# Patient Record
Sex: FEMALE | Race: WHITE
[De-identification: names, ages, dates, MRNs, and addresses within clinical notes are randomized per-mention and may not be internally consistent; named-entity substitution may affect disease eponyms.]

---

## 2017-04-28 VITALS — DIASTOLIC BLOOD PRESSURE: 71 MMHG | SYSTOLIC BLOOD PRESSURE: 146 MMHG

## 2017-06-02 ENCOUNTER — HOSPITAL ENCOUNTER (OUTPATIENT)
Dept: HOSPITAL 61 - ECHO | Age: 82
Discharge: HOME | End: 2017-06-02
Attending: INTERNAL MEDICINE
Payer: COMMERCIAL

## 2017-06-02 DIAGNOSIS — I27.0: ICD-10-CM

## 2017-06-02 DIAGNOSIS — I08.1: Primary | ICD-10-CM

## 2017-06-02 DIAGNOSIS — I50.9: ICD-10-CM

## 2017-06-02 PROCEDURE — 93306 TTE W/DOPPLER COMPLETE: CPT

## 2017-06-02 NOTE — CARD
--------------- APPROVED REPORT --------------





EXAM: Two-dimensional and M-mode echocardiogram with Doppler and color Doppler.



Other Information 

Quality : GoodHR: 81bpm

Rhythm : Atrial Fibrillation



INDICATION

Congestive Heart Failure 

Pulmonary hypertension



2D DIMENSIONS 

RVDd3.3 (2.9-3.5cm)Left Atrium(2D)4.5 (1.6-4.0cm)

IVSd1.0 (0.7-1.1cm)Aortic Root(2D)2.5 (2.0-3.7cm)

LVDd5.1 (3.9-5.9cm)LVOT Diameter2.0 (1.8-2.4cm)

PWd1.0 (0.7-1.1cm)LVDs3.4 (2.5-4.0cm)

FS (%) 32.3 %SV73.7 ml

LVEF(%)60.2 (>50%)



Aortic Valve

AoV Peak Berry.143.5cm/sAoV VTI33.5cm

AO Peak GR.8.2mmHgLVOT Peak Berry.103.6cm/s

AO Mean GR.5mmHgAVA (VMAX)2.33cm2



Mitral Valve

MV E Peak Gr.12mmHgMV E Mean Gr.3mmHg



Pulmonary Valve

PV Peak Vhmkyjsf75.1cm/s



Tricuspid Valve

TR P. Lnpgyfsi010rh/sTR Peak Gr.45mmHg



Pulmonary Vein

S1 Txzxgodj668.5cm/s



 LEFT VENTRICLE 

The left ventricle is normal size. There is normal left ventricular wall thickness. The left ventricu
lar systolic function is normal and the ejection fraction is within normal range. The Ejection Fracti
on is 55-60%. There is normal LV segmental wall motion. Tissue Doppler assessment suggests moderate l
eft ventricular diastolic dysfunction.



 RIGHT VENTRICLE 

The right ventricle is normal size. There is normal right ventricular wall thickness. The right ventr
icular systolic function is normal.



 ATRIA 

The left atrium is mildly dilated. The right atrium is mildly dilated. The interatrial septum is inta
ct with no evidence for an atrial septal defect or patent foramen ovale as noted on 2-D or Doppler im
aging.



 AORTIC VALVE 

The aortic valve is mildly sclerotic. The aortic valve is trileaflet. Doppler and Color Flow revealed
 no significant aortic regurgitation. There is no significant aortic valvular stenosis.



 MITRAL VALVE 

Mitral annular calcification is mild. The mitral valve leaflets are thickened. There is no evidence o
f mitral valve prolapse. There is no mitral valve stenosis. Doppler and Color Flow revealed mild mitr
al regurgitation.



 TRICUSPID VALVE 

Doppler and Color Flow revealed mild tricuspid regurgitation. The pulmonary artery systolic pressure 
is estimated at 48 mmHg. There is moderate pulmonary hypertension.



 PULMONIC VALVE 

Doppler and Color Flow revealed no pulmonic valvular regurgitation. There is no pulmonic valvular luis
nosis.



 GREAT VESSELS 

The aortic root is normal in size. The ascending aorta is normal in size. The pulmonary artery is nor
mal. The IVC is normal in size and collapses >50% with inspiration.



 PERICARDIAL EFFUSION 

There is no pleural effusion. There is no evidence of significant pericardial effusion.



Critical Notification

Critical Value: No



<Conclusion>

The left ventricular systolic function is normal and the ejection fraction is within normal range. Th
e Ejection Fraction is 55-60%.

There is normal LV segmental wall motion.

Tissue Doppler assessment suggests moderate left ventricular diastolic dysfunction.

Doppler and Color Flow revealed mild tricuspid regurgitation. The pulmonary artery systolic pressure 
is estimated at 48 mmHg. There is moderate pulmonary hypertension.

## 2018-02-10 ENCOUNTER — HOSPITAL ENCOUNTER (INPATIENT)
Dept: HOSPITAL 61 - ER | Age: 83
LOS: 1 days | Discharge: HOME | DRG: 292 | End: 2018-02-11
Attending: INTERNAL MEDICINE | Admitting: INTERNAL MEDICINE
Payer: COMMERCIAL

## 2018-02-10 DIAGNOSIS — I27.20: ICD-10-CM

## 2018-02-10 DIAGNOSIS — K22.4: ICD-10-CM

## 2018-02-10 DIAGNOSIS — I50.32: ICD-10-CM

## 2018-02-10 DIAGNOSIS — Z82.0: ICD-10-CM

## 2018-02-10 DIAGNOSIS — F02.80: ICD-10-CM

## 2018-02-10 DIAGNOSIS — G30.9: ICD-10-CM

## 2018-02-10 DIAGNOSIS — I11.0: Primary | ICD-10-CM

## 2018-02-10 DIAGNOSIS — Z79.01: ICD-10-CM

## 2018-02-10 DIAGNOSIS — E11.9: ICD-10-CM

## 2018-02-10 DIAGNOSIS — I48.2: ICD-10-CM

## 2018-02-10 DIAGNOSIS — Z79.84: ICD-10-CM

## 2018-02-10 DIAGNOSIS — Z79.82: ICD-10-CM

## 2018-02-10 DIAGNOSIS — Z87.891: ICD-10-CM

## 2018-02-10 DIAGNOSIS — Z91.040: ICD-10-CM

## 2018-02-10 DIAGNOSIS — I25.10: ICD-10-CM

## 2018-02-10 DIAGNOSIS — I16.0: ICD-10-CM

## 2018-02-10 DIAGNOSIS — E78.5: ICD-10-CM

## 2018-02-10 DIAGNOSIS — I48.0: ICD-10-CM

## 2018-02-10 DIAGNOSIS — Z88.8: ICD-10-CM

## 2018-02-10 DIAGNOSIS — I48.1: ICD-10-CM

## 2018-02-10 LAB
ADD MAN DIFF?: NO
ALBUMIN SERPL-MCNC: 3.7 G/DL (ref 3.4–5)
ALP SERPL-CCNC: 107 U/L (ref 46–116)
ALT (SGPT): 7 U/L (ref 14–59)
ANION GAP SERPL CALC-SCNC: 12 MMOL/L (ref 6–14)
AST SERPL-CCNC: 17 U/L (ref 15–37)
BASO #: 0 X10^3/UL (ref 0–0.2)
BASO %: 1 % (ref 0–3)
BILIRUB DIRECT SERPL-MCNC: 0.2 MG/DL (ref 0–0.2)
BLOOD UREA NITROGEN: 21 MG/DL (ref 7–20)
CALCIUM: 9.9 MG/DL (ref 8.5–10.1)
CHLORIDE: 101 MMOL/L (ref 98–107)
CO2 SERPL-SCNC: 26 MMOL/L (ref 21–32)
CREAT SERPL-MCNC: 0.9 MG/DL (ref 0.6–1)
EOS #: 0.1 X10^3/UL (ref 0–0.7)
EOS %: 1 % (ref 0–3)
GFR SERPLBLD BASED ON 1.73 SQ M-ARVRAT: 59.8 ML/MIN
GLUCOSE SERPL-MCNC: 210 MG/DL (ref 70–99)
HCG SERPL-ACNC: 5.5 X10^3/UL (ref 4–11)
HEMATOCRIT: 38.3 % (ref 36–47)
HEMOGLOBIN: 13.1 G/DL (ref 12–15.5)
INR: 2.4 (ref 0.8–1.1)
LIPASE: 128 U/L (ref 73–393)
LYMPH #: 1.2 X10^3/UL (ref 1–4.8)
LYMPH %: 21 % (ref 24–48)
MEAN CORPUSCULAR HEMOGLOBIN: 33 PG (ref 25–35)
MEAN CORPUSCULAR HGB CONC: 34 G/DL (ref 31–37)
MEAN CORPUSCULAR VOLUME: 97 FL (ref 79–100)
MONO #: 0.6 X10^3/UL (ref 0–1.1)
MONO %: 10 % (ref 0–9)
NEUT #: 3.7 X10^3UL (ref 1.8–7.7)
NEUT %: 67 % (ref 31–73)
NT-PRO BNP: 201 PG/ML (ref 0–449)
PARTIAL THROMBOPLASTIN TIME: 37 SEC (ref 24–38)
PLATELET COUNT: 259 X10^3/UL (ref 140–400)
POC GLUCOSE: 140 MG/DL (ref 70–99)
POC GLUCOSE: 155 MG/DL (ref 70–99)
POC GLUCOSE: 166 MG/DL (ref 70–99)
POTASSIUM SERPL-SCNC: 3.6 MMOL/L (ref 3.5–5.1)
PROTHROMBIN TIME PATIENT: 24.7 SEC (ref 11.7–14)
RED BLOOD COUNT: 3.95 X10^6/UL (ref 3.5–5.4)
RED CELL DISTRIBUTION WIDTH: 14.4 % (ref 11.5–14.5)
SODIUM: 139 MMOL/L (ref 136–145)
TOTAL BILIRUBIN: 0.6 MG/DL (ref 0.2–1)
TOTAL PROTEIN: 8.6 G/DL (ref 6.4–8.2)
TROPONINI: < 0.017 NG/ML (ref 0–0.06)

## 2018-02-10 PROCEDURE — 85730 THROMBOPLASTIN TIME PARTIAL: CPT

## 2018-02-10 PROCEDURE — 83690 ASSAY OF LIPASE: CPT

## 2018-02-10 PROCEDURE — 82962 GLUCOSE BLOOD TEST: CPT

## 2018-02-10 PROCEDURE — 71045 X-RAY EXAM CHEST 1 VIEW: CPT

## 2018-02-10 PROCEDURE — 84484 ASSAY OF TROPONIN QUANT: CPT

## 2018-02-10 PROCEDURE — 85610 PROTHROMBIN TIME: CPT

## 2018-02-10 PROCEDURE — 80061 LIPID PANEL: CPT

## 2018-02-10 PROCEDURE — 85025 COMPLETE CBC W/AUTO DIFF WBC: CPT

## 2018-02-10 PROCEDURE — 93005 ELECTROCARDIOGRAM TRACING: CPT

## 2018-02-10 PROCEDURE — 78452 HT MUSCLE IMAGE SPECT MULT: CPT

## 2018-02-10 PROCEDURE — 96375 TX/PRO/DX INJ NEW DRUG ADDON: CPT

## 2018-02-10 PROCEDURE — 99285 EMERGENCY DEPT VISIT HI MDM: CPT

## 2018-02-10 PROCEDURE — 83880 ASSAY OF NATRIURETIC PEPTIDE: CPT

## 2018-02-10 PROCEDURE — 96374 THER/PROPH/DIAG INJ IV PUSH: CPT

## 2018-02-10 PROCEDURE — 36415 COLL VENOUS BLD VENIPUNCTURE: CPT

## 2018-02-10 PROCEDURE — 93017 CV STRESS TEST TRACING ONLY: CPT

## 2018-02-10 PROCEDURE — 80076 HEPATIC FUNCTION PANEL: CPT

## 2018-02-10 PROCEDURE — 80048 BASIC METABOLIC PNL TOTAL CA: CPT

## 2018-02-10 RX ADMIN — CARVEDILOL 1 MG: 6.25 TABLET, FILM COATED ORAL at 12:52

## 2018-02-10 RX ADMIN — ASPIRIN 81 MG 1 MG: 81 TABLET ORAL at 07:12

## 2018-02-10 RX ADMIN — BACITRACIN 1 MLS/HR: 5000 INJECTION, POWDER, FOR SOLUTION INTRAMUSCULAR at 09:00

## 2018-02-10 RX ADMIN — POTASSIUM CHLORIDE 1 MEQ: 1500 TABLET, EXTENDED RELEASE ORAL at 12:51

## 2018-02-10 RX ADMIN — LOSARTAN POTASSIUM 1 MG: 50 TABLET ORAL at 12:51

## 2018-02-10 RX ADMIN — LABETALOL HYDROCHLORIDE 1 MG: 5 INJECTION, SOLUTION INTRAVENOUS at 07:07

## 2018-02-10 RX ADMIN — LABETALOL HYDROCHLORIDE 1 MG: 5 INJECTION, SOLUTION INTRAVENOUS at 07:13

## 2018-02-10 RX ADMIN — ATORVASTATIN CALCIUM 1 MG: 40 TABLET, FILM COATED ORAL at 20:41

## 2018-02-10 RX ADMIN — CARVEDILOL 1 MG: 6.25 TABLET, FILM COATED ORAL at 17:48

## 2018-02-10 RX ADMIN — ACETAMINOPHEN 1 MG: 325 TABLET, FILM COATED ORAL at 12:35

## 2018-02-10 RX ADMIN — WARFARIN SODIUM 1 MG: 2.5 TABLET ORAL at 17:48

## 2018-02-10 RX ADMIN — FUROSEMIDE 1 MG: 40 TABLET ORAL at 12:52

## 2018-02-10 RX ADMIN — ASPIRIN 81 MG 1 MG: 81 TABLET ORAL at 07:10

## 2018-02-11 LAB
ADD MAN DIFF?: NO
ANION GAP SERPL CALC-SCNC: 12 MMOL/L (ref 6–14)
BASO #: 0 X10^3/UL (ref 0–0.2)
BASO %: 0 % (ref 0–3)
BLOOD UREA NITROGEN: 19 MG/DL (ref 7–20)
CALCIUM: 9.4 MG/DL (ref 8.5–10.1)
CHLORIDE: 104 MMOL/L (ref 98–107)
CHOLESTEROL/HDL RATIO: 2.9
CHOLESTEROL: 149 MG/DL (ref 0–200)
CO2 SERPL-SCNC: 24 MMOL/L (ref 21–32)
CREAT SERPL-MCNC: 0.8 MG/DL (ref 0.6–1)
EOS #: 0 X10^3/UL (ref 0–0.7)
EOS %: 1 % (ref 0–3)
GFR SERPLBLD BASED ON 1.73 SQ M-ARVRAT: 68.5 ML/MIN
GLUCOSE SERPL-MCNC: 109 MG/DL (ref 70–99)
HCG SERPL-ACNC: 6.1 X10^3/UL (ref 4–11)
HDLC: 51 MG/DL (ref 40–60)
HEMATOCRIT: 35.7 % (ref 36–47)
HEMOGLOBIN: 12.4 G/DL (ref 12–15.5)
INR: 2.2 (ref 0.8–1.1)
LDLC: 80 MG/DL (ref 0–100)
LYMPH #: 2 X10^3/UL (ref 1–4.8)
LYMPH %: 33 % (ref 24–48)
MEAN CORPUSCULAR HEMOGLOBIN: 33 PG (ref 25–35)
MEAN CORPUSCULAR HGB CONC: 35 G/DL (ref 31–37)
MEAN CORPUSCULAR VOLUME: 96 FL (ref 79–100)
MONO #: 0.8 X10^3/UL (ref 0–1.1)
MONO %: 12 % (ref 0–9)
NEUT #: 3.2 X10^3UL (ref 1.8–7.7)
NEUT %: 53 % (ref 31–73)
NON-HDL CHOLESTEROL: 98 MG/DL (ref 0–129)
PLATELET COUNT: 236 X10^3/UL (ref 140–400)
POC GLUCOSE: 111 MG/DL (ref 70–99)
POC GLUCOSE: 238 MG/DL (ref 70–99)
POTASSIUM SERPL-SCNC: 3.7 MMOL/L (ref 3.5–5.1)
PROTHROMBIN TIME PATIENT: 23.2 SEC (ref 11.7–14)
RED BLOOD COUNT: 3.73 X10^6/UL (ref 3.5–5.4)
RED CELL DISTRIBUTION WIDTH: 14 % (ref 11.5–14.5)
SODIUM: 140 MMOL/L (ref 136–145)
TRIGLYCERIDES: 90 MG/DL (ref 0–150)
VLDLC: 18 MG/DL (ref 0–40)

## 2018-02-11 RX ADMIN — POTASSIUM CHLORIDE 1 MEQ: 1500 TABLET, EXTENDED RELEASE ORAL at 13:00

## 2018-02-11 RX ADMIN — FUROSEMIDE 1 MG: 40 TABLET ORAL at 13:00

## 2018-02-11 RX ADMIN — ASPIRIN 81 MG 1 MG: 81 TABLET ORAL at 13:00

## 2018-02-11 RX ADMIN — LOSARTAN POTASSIUM 1 MG: 50 TABLET ORAL at 13:00

## 2018-02-11 RX ADMIN — REGADENOSON 1 MG: 0.08 INJECTION, SOLUTION INTRAVENOUS at 10:42

## 2018-02-11 RX ADMIN — CARVEDILOL 1 MG: 6.25 TABLET, FILM COATED ORAL at 12:59

## 2018-02-11 RX ADMIN — Medication 1 EACH: at 08:13

## 2019-12-13 ENCOUNTER — HOSPITAL ENCOUNTER (OUTPATIENT)
Dept: HOSPITAL 61 - ER | Age: 84
Setting detail: OBSERVATION
LOS: 1 days | Discharge: HOME HEALTH | End: 2019-12-14
Attending: INTERNAL MEDICINE | Admitting: INTERNAL MEDICINE
Payer: COMMERCIAL

## 2019-12-13 VITALS — HEIGHT: 63 IN | BODY MASS INDEX: 28.35 KG/M2 | WEIGHT: 160 LBS

## 2019-12-13 VITALS — SYSTOLIC BLOOD PRESSURE: 148 MMHG | DIASTOLIC BLOOD PRESSURE: 59 MMHG

## 2019-12-13 VITALS — DIASTOLIC BLOOD PRESSURE: 58 MMHG | SYSTOLIC BLOOD PRESSURE: 136 MMHG

## 2019-12-13 DIAGNOSIS — Z82.49: ICD-10-CM

## 2019-12-13 DIAGNOSIS — Z91.040: ICD-10-CM

## 2019-12-13 DIAGNOSIS — E11.22: ICD-10-CM

## 2019-12-13 DIAGNOSIS — N18.9: ICD-10-CM

## 2019-12-13 DIAGNOSIS — Z82.3: ICD-10-CM
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
## 2019-12-13 DIAGNOSIS — Z79.01: ICD-10-CM
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
## 2019-12-13 DIAGNOSIS — E78.5: ICD-10-CM

## 2019-12-13 DIAGNOSIS — E11.42: ICD-10-CM

## 2019-12-13 DIAGNOSIS — S42.301A: Primary | ICD-10-CM

## 2019-12-13 DIAGNOSIS — Z98.41: ICD-10-CM

## 2019-12-13 DIAGNOSIS — Z83.3: ICD-10-CM

## 2019-12-13 DIAGNOSIS — I27.20: ICD-10-CM

## 2019-12-13 DIAGNOSIS — I50.31: ICD-10-CM

## 2019-12-13 DIAGNOSIS — I48.19: ICD-10-CM

## 2019-12-13 DIAGNOSIS — I13.0: ICD-10-CM

## 2019-12-13 DIAGNOSIS — Z98.42: ICD-10-CM
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
## 2019-12-13 DIAGNOSIS — M17.0: ICD-10-CM

## 2019-12-13 LAB
ALBUMIN SERPL-MCNC: 3.7 G/DL (ref 3.4–5)
ALBUMIN/GLOB SERPL: 0.9 {RATIO} (ref 1–1.7)
ALP SERPL-CCNC: 85 U/L (ref 46–116)
ALT SERPL-CCNC: 16 U/L (ref 14–59)
ANION GAP SERPL CALC-SCNC: 12 MMOL/L (ref 6–14)
AST SERPL-CCNC: 18 U/L (ref 15–37)
BASOPHILS # BLD AUTO: 0 X10^3/UL (ref 0–0.2)
BASOPHILS NFR BLD: 1 % (ref 0–3)
BILIRUB SERPL-MCNC: 0.8 MG/DL (ref 0.2–1)
BUN SERPL-MCNC: 19 MG/DL (ref 7–20)
BUN/CREAT SERPL: 19 (ref 6–20)
CALCIUM SERPL-MCNC: 9 MG/DL (ref 8.5–10.1)
CHLORIDE SERPL-SCNC: 103 MMOL/L (ref 98–107)
CO2 SERPL-SCNC: 26 MMOL/L (ref 21–32)
CREAT SERPL-MCNC: 1 MG/DL (ref 0.6–1)
EOSINOPHIL NFR BLD: 0 X10^3/UL (ref 0–0.7)
EOSINOPHIL NFR BLD: 1 % (ref 0–3)
ERYTHROCYTE [DISTWIDTH] IN BLOOD BY AUTOMATED COUNT: 15.7 % (ref 11.5–14.5)
GFR SERPLBLD BASED ON 1.73 SQ M-ARVRAT: 52.7 ML/MIN
GLOBULIN SER-MCNC: 4.3 G/DL (ref 2.2–3.8)
GLUCOSE SERPL-MCNC: 175 MG/DL (ref 70–99)
HCT VFR BLD CALC: 33.3 % (ref 36–47)
HGB BLD-MCNC: 11.3 G/DL (ref 12–15.5)
LYMPHOCYTES # BLD: 0.8 X10^3/UL (ref 1–4.8)
LYMPHOCYTES NFR BLD AUTO: 12 % (ref 24–48)
MCH RBC QN AUTO: 33 PG (ref 25–35)
MCHC RBC AUTO-ENTMCNC: 34 G/DL (ref 31–37)
MCV RBC AUTO: 96 FL (ref 79–100)
MONO #: 0.6 X10^3/UL (ref 0–1.1)
MONOCYTES NFR BLD: 8 % (ref 0–9)
NEUT #: 5.6 X10^3/UL (ref 1.8–7.7)
NEUTROPHILS NFR BLD AUTO: 79 % (ref 31–73)
PLATELET # BLD AUTO: 221 X10^3/UL (ref 140–400)
POTASSIUM SERPL-SCNC: 3.6 MMOL/L (ref 3.5–5.1)
PROT SERPL-MCNC: 8 G/DL (ref 6.4–8.2)
PROTHROMBIN TIME: 25 SEC (ref 11.7–14)
RBC # BLD AUTO: 3.46 X10^6/UL (ref 3.5–5.4)
SODIUM SERPL-SCNC: 141 MMOL/L (ref 136–145)
WBC # BLD AUTO: 7.1 X10^3/UL (ref 4–11)

## 2019-12-13 PROCEDURE — G0378 HOSPITAL OBSERVATION PER HR: HCPCS

## 2019-12-13 PROCEDURE — 80053 COMPREHEN METABOLIC PANEL: CPT

## 2019-12-13 PROCEDURE — 96376 TX/PRO/DX INJ SAME DRUG ADON: CPT

## 2019-12-13 PROCEDURE — 85025 COMPLETE CBC W/AUTO DIFF WBC: CPT

## 2019-12-13 PROCEDURE — 85610 PROTHROMBIN TIME: CPT

## 2019-12-13 PROCEDURE — 96374 THER/PROPH/DIAG INJ IV PUSH: CPT

## 2019-12-13 PROCEDURE — 36415 COLL VENOUS BLD VENIPUNCTURE: CPT

## 2019-12-13 PROCEDURE — 97162 PT EVAL MOD COMPLEX 30 MIN: CPT

## 2019-12-13 PROCEDURE — 82306 VITAMIN D 25 HYDROXY: CPT

## 2019-12-13 PROCEDURE — 71045 X-RAY EXAM CHEST 1 VIEW: CPT

## 2019-12-13 PROCEDURE — 97166 OT EVAL MOD COMPLEX 45 MIN: CPT

## 2019-12-13 PROCEDURE — 99284 EMERGENCY DEPT VISIT MOD MDM: CPT

## 2019-12-13 PROCEDURE — 93005 ELECTROCARDIOGRAM TRACING: CPT

## 2019-12-13 PROCEDURE — G0379 DIRECT REFER HOSPITAL OBSERV: HCPCS

## 2019-12-13 PROCEDURE — 96375 TX/PRO/DX INJ NEW DRUG ADDON: CPT

## 2019-12-13 PROCEDURE — 82962 GLUCOSE BLOOD TEST: CPT

## 2019-12-13 PROCEDURE — 73030 X-RAY EXAM OF SHOULDER: CPT

## 2019-12-13 RX ADMIN — MORPHINE SULFATE PRN MG: 2 INJECTION, SOLUTION INTRAMUSCULAR; INTRAVENOUS at 20:19

## 2019-12-13 RX ADMIN — MORPHINE SULFATE PRN MG: 2 INJECTION, SOLUTION INTRAMUSCULAR; INTRAVENOUS at 17:24

## 2019-12-13 NOTE — RAD
Examination: SHOULDER 2+V RIGHT

 

History: Pain

 

Comparison/Correlation: None

 

Findings: 2 images of the right shoulder were obtained.

The acromioclavicular joint degenerative narrowing is mild. Transverse 

fracture deformity with displacement is noted involving the right humeral 

neck. Glenohumeral joint relationship is probably adequate although 

evaluation is limited due to positioning.

 

Impression:

Displaced transverse fracture through the right humeral neck.

 

Electronically signed by: Demarco Aguilar MD (12/13/2019 3:23 PM) [facility]

## 2019-12-13 NOTE — PHYS DOC
Past Medical History


Past Medical History:  A-Fib, CHF, Hypertension


Past Surgical History:  No Surgical History


Alcohol Use:  None


Drug Use:  None





Adult General


Chief Complaint


Chief Complaint:  SHOULDER INJURY





HPI


HPI





Patient is an 85-year-old female who presents with complaint of right shoulder 

pain and injury after falling forward off of her bed. Patient states that she 

been sitting a little bit too far forward on the bed fell over. She denies any 

other injuries. Patient rates her pain to be a 9 out of 10. She denies head 

injury or loss of consciousness. She states that pain is worsened if she tries 

to move her shoulder.[]





Review of Systems


Review of Systems





Constitutional: Denies fever or chills []


Respiratory: Denies cough or shortness of breath []


Cardiovascular: No additional information not addressed in HPI []


GI: Denies abdominal pain, nausea, vomiting or diarrhea []


Musculoskeletal: Complains of right shoulder pain []


Integument: Denies rash or skin lesions []


Neurologic: Denies headache, focal weakness or sensory changes []





All other systems were reviewed and found to be within normal limits, except as 

documented in this note.





Current Medications


Current Medications





Current Medications








 Medications


  (Trade)  Dose


 Ordered  Sig/[facility]  Start Time


 Stop Time Status Last Admin


Dose Admin


 


 Fentanyl Citrate


  (Fentanyl 2ml


 Vial)  50 mcg  1X  ONCE  12/13/19 14:45


 12/13/19 14:46 DC 12/13/19 15:02


50 MCG


 


 Ondansetron HCl


  (Zofran)  4 mg  1X  ONCE  12/13/19 14:45


 12/13/19 14:46 DC 12/13/19 15:02


4 MG











Allergies


Allergies





Allergies








Coded Allergies Type Severity Reaction Last Updated Verified


 


  latex Allergy Intermediate RASH 4/25/17 Yes


 


  felodipine Adverse Reaction Mild COUGH 4/25/17 Yes


 


  lisinopril Adverse Reaction Mild COUGH 4/25/17 Yes











Physical Exam


Physical Exam





Constitutional: Well developed, well nourished, no acute distress, non-toxic 

appearance. []


HENT: Normocephalic, atraumatic, bilateral external ears normal, oropharynx 

moist, no oral exudates, nose normal. []


Eyes: PERRLA, EOMI, conjunctiva normal, no discharge. [] 


Neck: Normal range of motion, no tenderness, supple, no stridor. [] 


Cardiovascular: Regular rate and rhythm[]


Lungs & Thorax:  Bilateral breath sounds clear to auscultation []


Abdomen: Bowel sounds normal, soft, no tenderness. [] 


Skin: Warm, dry, no erythema, no rash. [] 


Extremities: No tenderness, no cyanosis, no clubbing, ROM intact. [] 


Neurologic: Alert and oriented X 3, no focal deficits noted. []





Current Patient Data


Vital Signs





                                   Vital Signs








  Date Time  Temp Pulse Resp B/P (MAP) Pulse Ox O2 Delivery O2 Flow Rate FiO2


 


12/13/19 15:02   12  95 Nasal Cannula 2.0 


 


12/13/19 14:18 98.0 74  150/65 (93)    





 98.0       








Lab Values





                                Laboratory Tests








Test


 12/13/19


14:55


 


White Blood Count


 7.1 x10^3/uL


(4.0-11.0)


 


Red Blood Count


 3.46 x10^6/uL


(3.50-5.40)  L


 


Hemoglobin


 11.3 g/dL


(12.0-15.5)  L


 


Hematocrit


 33.3 %


(36.0-47.0)  L


 


Mean Corpuscular Volume


 96 fL ()





 


Mean Corpuscular Hemoglobin 33 pg (25-35)  


 


Mean Corpuscular Hemoglobin


Concent 34 g/dL


(31-37)


 


Red Cell Distribution Width


 15.7 %


(11.5-14.5)  H


 


Platelet Count


 221 x10^3/uL


(140-400)


 


Neutrophils (%) (Auto) 79 % (31-73)  H


 


Lymphocytes (%) (Auto) 12 % (24-48)  L


 


Monocytes (%) (Auto) 8 % (0-9)  


 


Eosinophils (%) (Auto) 1 % (0-3)  


 


Basophils (%) (Auto) 1 % (0-3)  


 


Neutrophils # (Auto)


 5.6 x10^3/uL


(1.8-7.7)


 


Lymphocytes # (Auto)


 0.8 x10^3/uL


(1.0-4.8)  L


 


Monocytes # (Auto)


 0.6 x10^3/uL


(0.0-1.1)


 


Eosinophils # (Auto)


 0.0 x10^3/uL


(0.0-0.7)


 


Basophils # (Auto)


 0.0 x10^3/uL


(0.0-0.2)


 


Prothrombin Time


 25.0 SEC


(11.7-14.0)  H


 


Prothrombin Time INR


 2.3 (0.8-1.1)


H


 


Sodium Level


 141 mmol/L


(136-145)


 


Potassium Level


 3.6 mmol/L


(3.5-5.1)


 


Chloride Level


 103 mmol/L


()


 


Carbon Dioxide Level


 26 mmol/L


(21-32)


 


Anion Gap 12 (6-14)  


 


Blood Urea Nitrogen


 19 mg/dL


(7-20)


 


Creatinine


 1.0 mg/dL


(0.6-1.0)


 


Estimated GFR


(Cockcroft-Gault) 52.7  





 


BUN/Creatinine Ratio 19 (6-20)  


 


Glucose Level


 175 mg/dL


(70-99)  H


 


Calcium Level


 9.0 mg/dL


(8.5-10.1)


 


Total Bilirubin


 0.8 mg/dL


(0.2-1.0)


 


Aspartate Amino Transferase


(AST) 18 U/L (15-37)





 


Alanine Aminotransferase (ALT)


 16 U/L (14-59)





 


Alkaline Phosphatase


 85 U/L


()


 


Total Protein


 8.0 g/dL


(6.4-8.2)


 


Albumin


 3.7 g/dL


(3.4-5.0)


 


Albumin/Globulin Ratio


 0.9 (1.0-1.7)


L





                                Laboratory Tests


12/13/19 14:55








                                Laboratory Tests


12/13/19 14:55














EKG


EKG


[]





Radiology/Procedures


Radiology/Procedures


[]


Impressions:


PROCEDURE: SHOULDER 2+V RIGHT





Examination: SHOULDER 2+V RIGHT


 


History: Pain


 


Comparison/Correlation: None


 


Findings: 2 images of the right shoulder were obtained.


The acromioclavicular joint degenerative narrowing is mild. Transverse 


fracture deformity with displacement is noted involving the right humeral 


neck. Glenohumeral joint relationship is probably adequate although 


evaluation is limited due to positioning.


 


Impression:


Displaced transverse fracture through the right humeral neck.


 


Electronically signed by: Demarco Lopez MD (12/13/2019 3:23 PM) [facility]














DICTATED and SIGNED BY:     DEMARCO LOPEZ MD


DATE:     12/13/19 1523








Course & Med Decision Making


Course & Med Decision Making


Pertinent Labs and Imaging studies reviewed. (See chart for details)





[]





Dragon Disclaimer


Dragon Disclaimer


This electronic medical record was generated, in whole or in part, using a voice

 recognition dictation system.





Departure


Departure


Impression:  


   Primary Impression:  


   Fx humeral neck


Disposition:  09 ADMITTED AS INPATIENT


Admitting Physician:  RADHA (Dr. Schwab)


Condition:  IMPROVED


Referrals:  


ADRIANA TENORIO MD (PCP)





Problem Qualifiers








   Primary Impression:  


   Fx humeral neck


   Encounter type:  initial encounter  Fracture type:  closed  Laterality:  

   right  Qualified Codes:  S42.211A - Unspecified displaced fracture of 

   surgical neck of right humerus, initial encounter for closed fracture








SALINA GAMINO Jr. DO          Dec 13, 2019 15:17

## 2019-12-14 VITALS
DIASTOLIC BLOOD PRESSURE: 45 MMHG | SYSTOLIC BLOOD PRESSURE: 133 MMHG | SYSTOLIC BLOOD PRESSURE: 133 MMHG | DIASTOLIC BLOOD PRESSURE: 45 MMHG

## 2019-12-14 VITALS — SYSTOLIC BLOOD PRESSURE: 175 MMHG | DIASTOLIC BLOOD PRESSURE: 84 MMHG

## 2019-12-14 VITALS — SYSTOLIC BLOOD PRESSURE: 171 MMHG | DIASTOLIC BLOOD PRESSURE: 66 MMHG

## 2019-12-14 RX ADMIN — MORPHINE SULFATE PRN MG: 2 INJECTION, SOLUTION INTRAMUSCULAR; INTRAVENOUS at 02:48

## 2019-12-14 RX ADMIN — MORPHINE SULFATE PRN MG: 2 INJECTION, SOLUTION INTRAMUSCULAR; INTRAVENOUS at 08:13

## 2019-12-14 NOTE — DS
DATE OF DISCHARGE:  12/14/2019



CONSULTANTS:  Dr. Ross and Dr. Nguyen.



FINAL DIAGNOSES:

1.  Displaced transverse fracture through the right humerus neck secondary to a

mechanical fall.

2.  Persistent atrial fibrillation, on Coumadin.

3.  Diabetes mellitus type 2.

4.  Hypertension.

5.  Hyperlipidemia.



HOSPITAL COURSE:  The patient is an 85-year-old white female with a history of

persistent atrial fibrillation, on Coumadin; diabetes mellitus type 2;

hypertension; hyperlipidemia,, sitting at the side of the bed, getting ready to

fold laundry when she fell forward on her right side on the floor and ____ pain

in the right upper extremity, sought help at the [facility]

Emergency Room on 12/13/2019 where x-rays showed a displaced transverse fracture

of the right humerus neck.  Right shoulder was immobilized.  The Emergency Room

doctor spoke with the orthopedist and the Emergency Room felt that she did not

require any surgery or surgical intervention at this time and the patient was

admitted to the hospital for further observation.  She was seen in consultation

by Dr. Nguyen for physical rehabilitation consultation and felt that she can go

home with home health and physical therapy and she is seen by the physical

therapist right now and she is ambulating with a cane.  She is right handed and

her daughter lives with her and can assist with her at home.  The patient denied

any head trauma and does not have any headache or any other pain anywhere else. 

She does have osteoarthritis in her knees but we decided not to give her an

injection as she has diabetes mellitus and her blood sugars are running a little

bit high in the upper 100s.  Her blood pressure is elevated too and she was

given some analgesics and will be dismissed on hydrocodone p.r.n.  She is able

to ambulate with a cane and I just saw her moments ago with a physical

therapist.  



Review of her test results; sodium 141, potassium 3.6, chloride 103, total CO2

is 26, BUN 19, creatinine 1.0.  Fingerstick blood sugar was 198 last night. 

Liver function tests are normal.  Albumin was 3.7.  INR was 2.3.  White count

7.1, hemoglobin 11.3, platelet count 221,000.  EKG and chest x-ray, I just

ordered them and they have not been done yet.  She received physical therapy. 

She will receive occupational therapy and was seen by Dr. Nguyen who feels that

she can be dismissed today to home with home health.  We will dismiss her later

today if it is okay with the orthopedist.  Dr. Ross was consulted.  We are going

to contact him to see if he can see the patient today and did discuss the case

with him to see if she can go home with home health and home physical and

occupational therapy with a registered nurse.  She was told to make an

appointment to see Dr. Terrell in 1 week and follow up with Dr. Ross.  I will

have an order here for a toilet riser and a cane.  She will be dismissed on

Norco 5/325 one every 4 hours p.r.n. severe pain, 30 tablets and no refill and

also continue with her other current medications at home including amlodipine 10

mg every day, aspirin 81 mg every day, carvedilol 6.25 mg b.i.d., furosemide 40

mg every day, glipizide 5 mg every day, losartan 100 mg every day, lovastatin 40

mg every day, Coumadin 2 mg every day, potassium chloride 20 mEq every day,

metformin 500 mg 2 tablets in the morning and 1 tablet in the p.m.

 



______________________________

ADRIANA TERRELL MD DR:  CEASAR/hilario  JOB#:  [id number] / [id number]

DD:  12/14/2019 10:41  DT:  12/14/2019 12:36

## 2019-12-14 NOTE — HP
ADMIT DATE:  12/13/2019



LOCATION:  She is in room 432.



HISTORY OF PRESENT ILLNESS:  The patient is an 85-year-old white female with

history of diabetes mellitus type 2, hypertension, hyperlipidemia and persistent

atrial fibrillation, on Coumadin, who was sitting at the side of her bed at home

when she was ready to fold laundry and she fell on her right side and had

developed pain in the right upper extremity.  She denied any head trauma and/or

loss of consciousness.  She went to the [facility] Emergency Room

where x-rays revealed a displaced transverse fracture through the right humerus

neck.  She was placed on a right upper extremity splint and admitted to the

hospital for further evaluation and treatment.  She is comfortable with her

analgesics.  She is alert and coherent.  She does live with her daughter who

helps her at home.  She is right handed.



ALLERGIES AND INTOLERANCES:  INCLUDE FELODIPINE, LATEX, AND LISINOPRIL. 

LISINOPRIL CAUSED A COUGH.



MEDICATIONS:  Prior to admission include amlodipine 10 mg every day, aspirin 81

mg every day, carvedilol 6.25 mg b.i.d., furosemide 40 mg every day, glipizide 5

mg every day, losartan 50 mg every day, lovastatin 40 mg every day, Coumadin 2

mg every day, potassium chloride 20 mEq every day, metformin 500 mg 2 tablets at

breakfast and 1 tablet with dinner.



PAST MEDICAL HISTORY:  Significant for diabetes mellitus type 2, hypertension,

hyperlipidemia, persistent atrial fibrillation on Coumadin, esophageal

dilatation in the past, colon polypectomy, cardiac catheterization in the year

2000 with normal coronary arteries, bilateral cataract extraction, vitrectomy in

the right eye, severe pulmonary hypertension noted on echocardiogram and acute

diastolic congestive heart failure in 2017.



SOCIAL HISTORY:  She does not drink alcohol nor she smokes cigarettes.  She

lives with her daughter.



FAMILY HISTORY:  Noncontributory.



REVIEW OF SYSTEMS:

GENERAL:  No fever, chills or sweats.

CARDIOVASCULAR:  No chest pain.

PULMONARY:  No cough or shortness of breath.

GASTROINTESTINAL:  No constipation.

ENDOCRINE:  She has diabetes mellitus.

SKIN:  No rashes.

The rest of systems reviewed are negative except as stated in history of present

illness.



PHYSICAL EXAMINATION:

VITAL SIGNS:  Temperature is 97.8 degrees, apical pulse is irregular at 77,

respiratory rate is 20, and blood pressure is 171/66.  Oxygen saturation 93% and

that was on 2 liters per nasal cannula.

HEENT:  Eyes:  Gaze is conjugate.  Extraocular muscles are intact.  Mouth: 

Tongue is midline.

NECK:  There is no cervical lymphadenopathy or thyroid enlargement.

HEART:  Reveals an S1, S2.  There is no S3 or murmur.

LUNGS:  Clear.

ABDOMEN:  Soft with no hepatosplenomegaly, masses or tenderness.

EXTREMITIES:  Lower extremities without edema.  Dorsalis pedis pulses 2+

bilaterally.  Examination of right upper extremity, she has got a mobilizer to

the right upper extremity.

NEUROLOGIC:  No facial weakness.  She is able to wiggle fingers of the right

hand.  Good strength in the left hand .  Able to dorsi and plantarflex both

feet.  She is coherent.

SKIN:  No rashes.



LABORATORY DATA:  White count 7.1, hemoglobin 11.3, platelet count is 221,000,

79 polys and 12 lymphocytes.  INR was 2.3.  Sodium 141, potassium 3.6, chloride

103, total CO2 of 26, BUN 19, creatinine 1.0, blood sugar was 198 last night by

fingerstick.  Liver function tests were normal.  Albumin 3.7 and she had the

x-ray of the right shoulder, which showed a displaced transverse fracture

through the right humerus neck.



ASSESSMENT:

1.  Right humerus fracture due to a mechanical fall.

2.  Persistent atrial fibrillation, on Coumadin.

3.  Diabetes mellitus type 2.

4.  Hypertension.

5.  Hyperlipidemia.



PLAN:  At this time is to consult Dr. Ross for orthopedic consult.  We will

order physical and occupational therapy.  Continue with current her medications.

 Order some hydrocodone for pain control and obtain an EKG and a chest x-ray. 

Because she did not have any head trauma, we will hold off on a CAT scan of the

head.  Denies any headache.  She will be dismissed to home, possibly later today

if it is okay with the orthopedist and physical and occupational therapists. 

She does have a daughter to help her at home.  She will follow up with the

orthopedist also.

 



______________________________

ADRIANA TENORIO MD



DR:  CEASAR/hilario  JOB#:  [id number] / [id number]

DD:  12/14/2019 10:24  DT:  12/14/2019 11:25

## 2019-12-14 NOTE — NUR
Discharge Note:



JUVE MIRZA [address]



Discharge instructions and discharge home medications reviewed with Patient and daughter 
Keli and a copy given. All questions have been answered and understanding verbalized. 



The following instructions and handouts were given: information about shoulder fracture, 
medications, follow up appointments, home health information.  Also instructed patient and 
daughter that lab stated that vitamin D levels were not processed on the weekend so to call 
back on Monday or Tuesday for results.



Discontinued lines and drains: IV line in left AC removed, catheter tip intact.



Patient discharged to home with home health with daughter, wheelchair used for mobility to 
discharge vehicle.

## 2019-12-14 NOTE — PDOC2
CONSULT


Date of Consult


Date of Consult


DATE: 19 


TIME: 11:11





Referring Physician


Referring Physician:


Xander





Identification/Chief Complaint


Chief Complaint


right shoulder pain





Source


Source:  Chart review, Patient





History of Present Illness


Reason for Visit:


The patient is an 85-year-old right handed woman with


history of diabetes mellitus type 2, hypertension, hyperlipidemia and persistent


atrial fibrillation, on Coumadin, who was sitting at the side of her bed at home


when she was ready to fold laundry and she fell on her right side and had


developed pain in the right upper extremity.  She denied any head trauma and/or


loss of consciousness.  She went to the [facility] Emergency Room


where x-rays revealed a displaced transverse fracture through the right humerus


neck.  She was placed on a right upper extremity splint and admitted to the


hospital for further evaluation and treatment.  She is comfortable with her


analgesics.  She is alert and coherent.  She does live with her daughter who


helps her at home. She was admitted primarily for pain control. I spoke to the 

emergency room last night and believe this will be treated nonoperatively and 

spoke to the patient about that today. Dr. Terrell saw the patient today and is 

planning for discharge with home health. I agree with that plan. The patient 

agrees as well.





Past Medical History


Cardiovascular:  AFIB, HTN, Hyperlipidemia


Pulmonary:  No pertinent hx


CENTRAL NERVOUS SYSTEM:  Other


GI:  Diverticulosis, GERD, Other


Heme/Onc:  No pertinent hx


Hepatobiliary:  No pertinent hx


Psych:  No pertinent hx


Musculoskeletal:  Osteoarthritis


Infectious disease:  No pertinent hx


Renal/:  Chronic renal insuff


Endocrine:  Diabetes





Past Surgical History


Past Surgical History:  Cataract Removal, Other





Family History


Family History:  Coronary Artery Disease, Diabetes, Stroke





Social History


ALCOHOL:  none


Drugs:  None


Lives:  with Family





Current Problem List


Problem List


Problems


Medical Problems:


(1) Fx humeral neck


Status: Acute  











Current Medications


Current Medications





Current Medications


Fentanyl Citrate (Fentanyl 2ml Vial) 50 mcg 1X  ONCE IVP  Last administered on 

19at 15:02;  Start 19 at 14:45;  Stop 19 at 14:46;  Status DC


Ondansetron HCl (Zofran) 4 mg 1X  ONCE IV  Last administered on 19at 

15:02;  Start 19 at 14:45;  Stop 19 at 14:46;  Status DC


Ondansetron HCl (Zofran) 4 mg PRN Q8HRS  PRN IV NAUSEA/VOMITING;  Start 19

at 16:00;  Stop 19 at 15:59


Morphine Sulfate (Morphine Sulfate) 2 mg PRN Q2HR  PRN IV PAIN Last administered

on  08:13;  Start 19 at 16:00;  Stop 19 at 15:59


Amlodipine Besylate (Norvasc) 10 mg DAILY PO  Last administered on 19at 

08:11;  Start 19 at 09:00


Aspirin (Ecotrin) 81 mg DAILYWBKFT PO  Last administered on  08:10;  

Start 19 at 08:00


Furosemide (Lasix) 40 mg DAILY PO  Last administered on  08:10;  Start

19 at 09:00


Glipizide (Glucotrol) 5 mg DAILY PO  Last administered on 19at 08:10;  

Start 19 at 09:00


Metformin HCl (Glucophage) 500 mg BIDWMEALS PO  Last administered on  

08:11;  Start 19 at 08:00


Potassium Chloride (Klor-Con) 20 meq DAILYWBKFT PO  Last administered on 

19at 08:10;  Start 19 at 08:00


Warfarin Sodium (Coumadin) 2 mg DAILY16 PO ;  Start 19 at 16:00


Carvedilol (Coreg) 6.25 mg BIDWMEALS PO  Last administered on 19at 08:11; 

Start 19 at 08:00


Losartan Potassium (Cozaar) 50 mg DAILY PO  Last administered on 19at 

08:10;  Start 19 at 09:00


Atorvastatin Calcium (Lipitor) 10 mg HS PO ;  Start 19 at 21:00


Warfarin Sodium (Coumadin Per Physician) 1 each PRN DAILY  PRN MC SEE COMMENTS; 

Start 19 at 16:00


Acetaminophen/ Hydrocodone Bitart (Lortab 5/325) 1 tab PRN Q4HRS  PRN PO 

MODERATE PAIN;  Start 19 at 10:30





Active Scripts


Active


Hydrocodone-Apap 5-325  ** (Hydrocodone Bit/Acetaminophen) 1 Tab Tablet 1 Tab PO

PRN Q4HRS PRN


Coumadin (Warfarin Sodium) 2.5 Mg Tablet 2 Mg PO DAILY16


Furosemide 40 Mg Tablet 40 Mg PO DAILY


Reported


Carvedilol 25 Mg Tablet 6.25 Mg PO BIDWMEALS


Aspirin Ec (Aspirin) 81 Mg Tablet.dr 81 Mg PO 


Potassium Chloride ** (Potassium Chloride) 20 Meq Tablet.er 20 Meq PO DAILY


Lovastatin 40 Mg Tablet 40 Mg PO HS


Metformin Hcl 500 Mg Tablet 500 Mg PO BIDWMEALS


Tylenol (Acetaminophen) 325 Mg Tablet 1-2 Tab PO PRN Q4HRS


Losartan Potassium 100 Mg Tablet 50 Mg PO DAILY


Glipizide 5 Mg Tablet 5 Mg PO DAILY


Amlodipine Besylate 10 Mg Tablet 10 Mg PO DAILY





Allergies


Allergies:  


Coded Allergies:  


     latex (Verified  Allergy, Intermediate, RASH, 17)


     felodipine (Verified  Adverse Reaction, Mild, COUGH, 17)


     lisinopril (Verified  Adverse Reaction, Mild, COUGH, 17)





Physical Exam


General:  Alert, Cooperative


Lungs:  Normal air movement


Heart:  Regular rate


Abdomen:  Soft


Extremities:  Other (the right shoulder shows grossly normal alignment. There is

tenderness at the proximal humerus. There is no ecchymosis at this time. She is 

in a sling and appears comfortable. Light touch sensation is intact distally and

she demonstrated motor function of the radial ulnar and median nerves distally.)


Skin:  No significant lesion


Neuro:  Normal speech, Sensation intact





Vitals


VITALS





Vital Signs








  Date Time  Temp Pulse Resp B/P (MAP) Pulse Ox O2 Delivery O2 Flow Rate FiO2


 


19 09:16     93 Nasal Cannula 2.0 


 


19 08:11  77  171/66    


 


19 07:00 97.8  20     





 97.8       











Labs


Labs





Laboratory Tests








Test


 19


14:55 19


17:19 19


20:51


 


White Blood Count


 7.1 x10^3/uL


(4.0-11.0) 


 





 


Red Blood Count


 3.46 x10^6/uL


(3.50-5.40) 


 





 


Hemoglobin


 11.3 g/dL


(12.0-15.5) 


 





 


Hematocrit


 33.3 %


(36.0-47.0) 


 





 


Mean Corpuscular Volume 96 fL ()   


 


Mean Corpuscular Hemoglobin 33 pg (25-35)   


 


Mean Corpuscular Hemoglobin


Concent 34 g/dL


(31-37) 


 





 


Red Cell Distribution Width


 15.7 %


(11.5-14.5) 


 





 


Platelet Count


 221 x10^3/uL


(140-400) 


 





 


Neutrophils (%) (Auto) 79 % (31-73)   


 


Lymphocytes (%) (Auto) 12 % (24-48)   


 


Monocytes (%) (Auto) 8 % (0-9)   


 


Eosinophils (%) (Auto) 1 % (0-3)   


 


Basophils (%) (Auto) 1 % (0-3)   


 


Neutrophils # (Auto)


 5.6 x10^3/uL


(1.8-7.7) 


 





 


Lymphocytes # (Auto)


 0.8 x10^3/uL


(1.0-4.8) 


 





 


Monocytes # (Auto)


 0.6 x10^3/uL


(0.0-1.1) 


 





 


Eosinophils # (Auto)


 0.0 x10^3/uL


(0.0-0.7) 


 





 


Basophils # (Auto)


 0.0 x10^3/uL


(0.0-0.2) 


 





 


Prothrombin Time


 25.0 SEC


(11.7-14.0) 


 





 


Prothromb Time International


Ratio 2.3 (0.8-1.1) 


 


 





 


Sodium Level


 141 mmol/L


(136-145) 


 





 


Potassium Level


 3.6 mmol/L


(3.5-5.1) 


 





 


Chloride Level


 103 mmol/L


() 


 





 


Carbon Dioxide Level


 26 mmol/L


(21-32) 


 





 


Anion Gap 12 (6-14)   


 


Blood Urea Nitrogen


 19 mg/dL


(7-20) 


 





 


Creatinine


 1.0 mg/dL


(0.6-1.0) 


 





 


Estimated GFR


(Cockcroft-Gault) 52.7 


 


 





 


BUN/Creatinine Ratio 19 (6-20)   


 


Glucose Level


 175 mg/dL


(70-99) 


 





 


Calcium Level


 9.0 mg/dL


(8.5-10.1) 


 





 


Total Bilirubin


 0.8 mg/dL


(0.2-1.0) 


 





 


Aspartate Amino Transf


(AST/SGOT) 18 U/L (15-37) 


 


 





 


Alanine Aminotransferase


(ALT/SGPT) 16 U/L (14-59) 


 


 





 


Alkaline Phosphatase


 85 U/L


() 


 





 


Total Protein


 8.0 g/dL


(6.4-8.2) 


 





 


Albumin


 3.7 g/dL


(3.4-5.0) 


 





 


Albumin/Globulin Ratio 0.9 (1.0-1.7)   


 


Glucose (Fingerstick)


 


 179 mg/dL


(70-99) 198 mg/dL


(70-99)








Laboratory Tests








Test


 19


14:55 19


17:19 19


20:51


 


White Blood Count


 7.1 x10^3/uL


(4.0-11.0) 


 





 


Red Blood Count


 3.46 x10^6/uL


(3.50-5.40) 


 





 


Hemoglobin


 11.3 g/dL


(12.0-15.5) 


 





 


Hematocrit


 33.3 %


(36.0-47.0) 


 





 


Mean Corpuscular Volume 96 fL ()   


 


Mean Corpuscular Hemoglobin 33 pg (25-35)   


 


Mean Corpuscular Hemoglobin


Concent 34 g/dL


(31-37) 


 





 


Red Cell Distribution Width


 15.7 %


(11.5-14.5) 


 





 


Platelet Count


 221 x10^3/uL


(140-400) 


 





 


Neutrophils (%) (Auto) 79 % (31-73)   


 


Lymphocytes (%) (Auto) 12 % (24-48)   


 


Monocytes (%) (Auto) 8 % (0-9)   


 


Eosinophils (%) (Auto) 1 % (0-3)   


 


Basophils (%) (Auto) 1 % (0-3)   


 


Neutrophils # (Auto)


 5.6 x10^3/uL


(1.8-7.7) 


 





 


Lymphocytes # (Auto)


 0.8 x10^3/uL


(1.0-4.8) 


 





 


Monocytes # (Auto)


 0.6 x10^3/uL


(0.0-1.1) 


 





 


Eosinophils # (Auto)


 0.0 x10^3/uL


(0.0-0.7) 


 





 


Basophils # (Auto)


 0.0 x10^3/uL


(0.0-0.2) 


 





 


Prothrombin Time


 25.0 SEC


(11.7-14.0) 


 





 


Prothromb Time International


Ratio 2.3 (0.8-1.1) 


 


 





 


Sodium Level


 141 mmol/L


(136-145) 


 





 


Potassium Level


 3.6 mmol/L


(3.5-5.1) 


 





 


Chloride Level


 103 mmol/L


() 


 





 


Carbon Dioxide Level


 26 mmol/L


(21-32) 


 





 


Anion Gap 12 (6-14)   


 


Blood Urea Nitrogen


 19 mg/dL


(7-20) 


 





 


Creatinine


 1.0 mg/dL


(0.6-1.0) 


 





 


Estimated GFR


(Cockcroft-Gault) 52.7 


 


 





 


BUN/Creatinine Ratio 19 (6-20)   


 


Glucose Level


 175 mg/dL


(70-99) 


 





 


Calcium Level


 9.0 mg/dL


(8.5-10.1) 


 





 


Total Bilirubin


 0.8 mg/dL


(0.2-1.0) 


 





 


Aspartate Amino Transf


(AST/SGOT) 18 U/L (15-37) 


 


 





 


Alanine Aminotransferase


(ALT/SGPT) 16 U/L (14-59) 


 


 





 


Alkaline Phosphatase


 85 U/L


() 


 





 


Total Protein


 8.0 g/dL


(6.4-8.2) 


 





 


Albumin


 3.7 g/dL


(3.4-5.0) 


 





 


Albumin/Globulin Ratio 0.9 (1.0-1.7)   


 


Glucose (Fingerstick)


 


 179 mg/dL


(70-99) 198 mg/dL


(70-99)











Images


Images


Report reviewed and images independently reviewed. Transverse comminuted 

proximal humerus fracture and osteopenia. I believe the outlet view shows no 

dislocation.





[facility]  


                              [address] [place] [address]  


                                           [phone]  


 


                                           IMAGING REPORT  


 


                                               Signed  


 


PATIENT: JUVE MIRZA            ACCOUNT: [id number]            MRN#: 

[id number]  


: 1934                  LOCATION: ER                     AGE: 85  


SEX: F                           EXAM DT: 19                ACCESSION#: 

[id number]  


STATUS: REG ER                   ORD. PHYSICIAN: SALINA GAMINO Jr. DO  


REASON: fall; suspect humeral neck fracture  


PROCEDURE: SHOULDER 2+V RIGHT  


 


Examination: SHOULDER 2+V RIGHT  


 


 History: Pain  


 


 Comparison/Correlation: None  


 


 Findings: 2 images of the right shoulder were obtained.  


 The acromioclavicular joint degenerative narrowing is mild. Transverse   


 fracture deformity with displacement is noted involving the right humeral   


 neck. Glenohumeral joint relationship is probably adequate although   


 evaluation is limited due to positioning.  


 


 Impression:  


 Displaced transverse fracture through the right humeral neck.  


 


 Electronically signed by: Demarco Lopez MD (2019 3:23 PM) [facility]  


 


 


 


 


DICTATED and SIGNED BY:     DEMARCO LOPEZ MD  


DATE:     19 1523





Assessment/Plan


Assessment/Plan


I recommend nonoperative treatment. These usually heal quite well without 

surgery, although her later motion might be limited. Surgery unfortunately does 

not seem to improve that range of motion, as it is often limited after attempted

surgical fixation. Fixation for this fracture would be quite difficult because 

of the poor bone quality and the comminution. The latest recommendations and the

literature would be nonoperative treatment and consideration of future surgery 

if satisfactory outcome is not achieved after healing. I think she can go home 

on oral medicines and we'll see her in the office in about 2 weeks for follow-up

x-rays and further instructions about range of motion and possible therapy. She 

might do pendulum exercises now, as allowed by pain. I also will check a vitamin

D level to see if it's adequate for bone healing.











PRANAV MOREL MD                 Dec 14, 2019 11:12

## 2019-12-14 NOTE — SNU/HH DC
DISCHARGE WITH HOME HEALTH


DISCHARGE INFORMATION:


Discharge Date:  Dec 14, 2019


Final Diagnosis:


Problems


Medical Problems:


(1) Fx humeral neck


Status: Acute  








Condition on Discharge:  Stable





CODE STATUS:


Code Status:  Full





HOME HEALTH:


Face to Face:


I certify this patient is under my care and that I, or a nurse practitioner or 

physician's assistant working with me, had a face to face encounter that meets 

the physician face to face encounter requirements with this patient on 

[12/14/19].


RN For Eval/Treatment:  Yes


Physical Therapy For:  Evalulation/Treatment


Occupational Therapy For:  Evaluation/Treatment


Pt Meets Homebound Status:  Extreme weakness w/ amb.





POST DISCHARGE ORDERS:


Activity Instructions for Disc:  No restrictions


Weight Bearing Status after Di:  As tolerated


DIET AFTER DISCHARGE:  ADA





CHECKS AFTER DISCHARGE:


Checks after discharge:  Check blood press - daily, Check blood sugar, ac/hs





FOLLOW-UP:


Follow up with:  dr. tenorio in 1 week


Follow Up With:  dr. mauro next week


Warfarin Follow UP:  weekly and fax results to dr. tenorio. fax [phone]





TREATMENT/EQUIPMENT ORDERS:


Adaptive Equipment Issued:  None





CERTIFICATION STATEMENT:


Certification Statement:


Certification Statement: Based on the above finding, I certify that this patient

 is confined to the home and needs intermittent skilled nursing care, physical 

therapy and/or speech therapy, or continues to need occupational therapy.~ This 

patient is under my care, and I have initiated the establishment of the plan of 

care.~ This patient will be followed by myself or a community physician who will

 periodically review the plan of care.


Home Meds


Active Scripts


Hydrocodone Bit/Acetaminophen (HYDROCODONE-APAP 5-325  **) 1 Tab Tablet, 1 TAB 

PO PRN Q4HRS PRN for severe pain, #30 TAB


   Prov:ADRIANA TENORIO MD         12/14/19


Warfarin Sodium (COUMADIN) 2.5 Mg Tablet, 2 MG PO DAILY16 for atrial 

fibrillation, #30 TAB


   Prov:ADRIANA TENORIO MD         12/14/19


Furosemide (FUROSEMIDE) 40 Mg Tablet, 40 MG PO DAILY, #30 MG


   Prov:ADRIANA TENORIO MD         4/28/17


Reported Medications


Carvedilol (CARVEDILOL) 25 Mg Tablet, 6.25 MG PO BIDWMEALS for CARDIAC, TAB


   12/13/19


Aspirin (ASPIRIN EC) 81 Mg Tablet.dr, 81 MG PO, TAB.SR


   2/10/18


Potassium Chloride (POTASSIUM CHLORIDE **) 20 Meq Tablet.er, 20 MEQ PO DAILY, 

TAB.SR


   2/10/18


Lovastatin (LOVASTATIN) 40 Mg Tablet, 40 MG PO HS, TAB


   2/10/18


Metformin Hcl (METFORMIN HCL) 500 Mg Tablet, 500 MG PO BIDWMEALS for ANTI-

DIABETIC, TAB 0 Refills


   2/10/18


Acetaminophen (TYLENOL) 325 Mg Tablet, 1-2 TAB PO PRN Q4HRS for PAIN, #30 TAB


   4/25/17


Losartan Potassium (LOSARTAN POTASSIUM) 100 Mg Tablet, 50 MG PO DAILY for htn, 

TAB


   4/25/17


Glipizide (GLIPIZIDE) 5 Mg Tablet, 5 MG PO DAILY, TAB


   4/25/17


Amlodipine Besylate (AMLODIPINE BESYLATE) 10 Mg Tablet, 10 MG PO DAILY, TAB


   4/25/17


Discontinued Reported Medications


Warfarin Sodium (COUMADIN) 2.5 Mg Tablet, 2.5 MG PO DAILY, TAB


   2/10/18











ADRIANA TENORIO MD           Dec 14, 2019 10:45

## 2019-12-14 NOTE — EKG
[facility]

               [address] [place] [address]

Test Date:    2019               Test Time:    12:50:07

Pat Name:     JUVE MIRZA               Department:   

Patient ID:   [id number]           Room:         432 1

Gender:       F                        Technician:   JESSICA

:          1934               Requested By: ADRIANA TENORIO

Order Number: [id number]           Reading MD:     

                                 Measurements

Intervals                              Axis          

Rate:         78                       P:            

VA:                                    QRS:          30

QRSD:         88                       T:            90

QT:           402                                    

QTc:          462                                    

                           Interpretive Statements

IRREGULAR RHYTHM, NO P-WAVE FOUND

LOW LIMB LEAD VOLTAGE

NO SPECIFIC ECG ABNORMALITIES

RI6.01

Compared to ECG 02/10/2018 06:27:53

Sinus rhythm no longer present

First degree AV block no longer present

## 2019-12-14 NOTE — RAD
AP chest.

 

HISTORY: Fall, right shoulder fracture

 

AP view was taken of the chest. Again noted is the fracture of the 

proximal right humerus. Heart is enlarged. Lungs are free of infiltrates. 

There is no effusion.

 

IMPRESSION:

1. Right humerus fracture.

2. No acute infiltrates.

 

Electronically signed by: Ruy Bautista MD (12/14/2019 12:38 PM) [facility]

## 2019-12-14 NOTE — PDOC
Provider Note


Provider Note


history and physical dictated  # [id number]











ADRIANA TENORIO MD           Dec 14, 2019 10:25

## 2019-12-14 NOTE — NUR
When CNA checked vital signs patients SpO2 was 86% on room air, writer re-checked patient 
and her SpO2 was 95% on room air, will continue to monitor patient.

## 2019-12-14 NOTE — PDOC
Provider Note


Provider Note


discharge summary dictated  # [id number]











ADRIANA TENORIO MD           Dec 14, 2019 10:42

## 2019-12-14 NOTE — CONS
DATE OF CONSULTATION:  12/14/2019



LOCATION:  She is in room 432.



ATTENDING PHYSICIAN:  Dr. Terrell.



REASON FOR CONSULTATION:  The patient was seen at the request of Dr. Terrell for

rehab evaluation.



HISTORY OF PRESENT ILLNESS:  This is an 85-year-old right-handed female admitted

on 12/13/2019 after she slid from edge of the bed and injured her right

shoulder.  The patient was found with displaced transverse fracture through the

right humeral neck and she was seen by Orthopedics and no plans for surgical

treatment.  The patient received an arm sling.  The patient admits some pain in

her shoulder and also some pain in her right knee, mainly while up walking.  She

lives with her daughter who does not work and no steps for her to manage.  She

usually walks without any assistive devices, but had a roller walker at home.



ALLERGIES:  THE PATIENT IS KNOWN ALLERGIC TO FELODIPINE, LATEX, LISINOPRIL.



PAST MEDICAL HISTORY:  Includes atrial fibrillation, congestive heart failure,

hypertension.  She is on anticoagulation.  The patient was noted with INR of

2.5, PT of 25.



PHYSICAL EXAMINATION:  The patient on physical examination today revealed an

elderly female.  She is alert, oriented to time, place, person and circumstance

and follows commands appropriately.  She is alert.  She had arm sling in place

to her right upper extremity somewhat higher up and I have adjusted it to proper

position.  She had some pain on range of motion of right shoulder with diffuse

tenderness to palpation of right shoulder.  She had crepitus on range of motion

of both knee joints without any significant discomfort, but mild knee joint

effusion.  She had 4+/5 grade muscle strength overall and deep tendon reflexes

are decreased at both knees and absent at both ankles.  She had equal perception

of touch and pinprick sensation bilaterally.  She requires some help with bed

mobility, but she is independent with transfers and up walking.  She walks with

somewhat wide-based gait slowly.



ASSESSMENT:  Elderly female with recent fall and fracture of right humeral neck

with displacement, plans for nonsurgical treatment.  Also presented with

degenerative joint disease of both knees, clinical evidence of peripheral

neuropathy.



RECOMMENDATIONS:  As she is doing alright and she lives with her daughter who is

there all the time at home, agree with the plans for home with home health

followup.  I have asked physical therapist to train her using a cane while up

walking to help with her balance.



Dr. Terrell, I appreciate asking me to participate in the care of this

interesting patient.  I will be glad to follow her with you as needed for her

rehabilitation.

 



______________________________

RYAN TRIPP MD



DR:  HARJIT/hilario  JOB#:  [id number] / [id number]

DD:  12/14/2019 10:58  DT:  12/14/2019 21:18

## 2019-12-14 NOTE — SNU/HH DC
DISCHARGE WITH HOME HEALTH


DISCHARGE INFORMATION:


Discharge Date:  Dec 14, 2019


Final Diagnosis:


Problems


Medical Problems:


(1) Fx humeral neck


Status: Acute  








Condition on Discharge:  Stable





CODE STATUS:


Code Status:  Full





HOME HEALTH:


Face to Face:


I certify this patient is under my care and that I, or a nurse practitioner or 

physician's assistant working with me, had a face to face encounter that meets 

the physician face to face encounter requirements with this patient on 

[12/14/19].


RN For Eval/Treatment:  Yes


Physical Therapy For:  Evalulation/Treatment


Occupational Therapy For:  Evaluation/Treatment


Pt Meets Homebound Status:  Unsteady balance w/ amb,





POST DISCHARGE ORDERS:


Activity Instructions for Disc:  No restrictions


Weight Bearing Status after Di:  As tolerated


DIET AFTER DISCHARGE:  ADA





CHECKS AFTER DISCHARGE:


Checks after discharge:  Check blood press - daily, Check blood sugar, ac/hs





FOLLOW-UP:


PCP to follow Home Health:


dr. tenorio


Follow up with:  dr. tenorio in 1 week


Follow Up With:  dr. mauro next week





TREATMENT/EQUIPMENT ORDERS:


Adaptive Equipment Issued:  None





CERTIFICATION STATEMENT:


Certification Statement:


Certification Statement: Based on the above finding, I certify that this patient

 is confined to the home and needs intermittent skilled nursing care, physical 

therapy and/or speech therapy, or continues to need occupational therapy.~ This 

patient is under my care, and I have initiated the establishment of the plan of 

care.~ This patient will be followed by myself or a community physician who will

 periodically review the plan of care.


Home Meds


Active Scripts


Hydrocodone Bit/Acetaminophen (HYDROCODONE-APAP 5-325  **) 1 Tab Tablet, 1 TAB 

PO PRN Q4HRS PRN for severe pain, #30 TAB


   Prov:ADRIANA TENORIO MD         12/14/19


Warfarin Sodium (COUMADIN) 2.5 Mg Tablet, 2 MG PO DAILY16 for atrial 

fibrillation, #30 TAB


   Prov:ADRIANA TENORIO MD         12/14/19


Furosemide (FUROSEMIDE) 40 Mg Tablet, 40 MG PO DAILY, #30 MG


   Prov:ADRIANA TENORIO MD         4/28/17


Reported Medications


Carvedilol (CARVEDILOL) 25 Mg Tablet, 6.25 MG PO BIDWMEALS for CARDIAC, TAB


   12/13/19


Aspirin (ASPIRIN EC) 81 Mg Tablet.dr, 81 MG PO, TAB.SR


   2/10/18


Potassium Chloride (POTASSIUM CHLORIDE **) 20 Meq Tablet.er, 20 MEQ PO DAILY, 

TAB.SR


   2/10/18


Lovastatin (LOVASTATIN) 40 Mg Tablet, 40 MG PO HS, TAB


   2/10/18


Metformin Hcl (METFORMIN HCL) 500 Mg Tablet, 500 MG PO BIDWMEALS for ANTI-

DIABETIC, TAB 0 Refills


   2/10/18


Acetaminophen (TYLENOL) 325 Mg Tablet, 1-2 TAB PO PRN Q4HRS for PAIN, #30 TAB


   4/25/17


Losartan Potassium (LOSARTAN POTASSIUM) 100 Mg Tablet, 50 MG PO DAILY for htn, 

TAB


   4/25/17


Glipizide (GLIPIZIDE) 5 Mg Tablet, 5 MG PO DAILY, TAB


   4/25/17


Amlodipine Besylate (AMLODIPINE BESYLATE) 10 Mg Tablet, 10 MG PO DAILY, TAB


   4/25/17


Discontinued Reported Medications


Warfarin Sodium (COUMADIN) 2.5 Mg Tablet, 2.5 MG PO DAILY, TAB


   2/10/18











ADRIANA TENORIO MD           Dec 14, 2019 10:36

## 2020-12-04 ENCOUNTER — HOSPITAL ENCOUNTER (OUTPATIENT)
Dept: HOSPITAL 61 - ECHO | Age: 85
End: 2020-12-04
Attending: INTERNAL MEDICINE
Payer: MEDICARE

## 2020-12-04 DIAGNOSIS — I08.3: Primary | ICD-10-CM

## 2020-12-04 DIAGNOSIS — I48.91: ICD-10-CM

## 2020-12-04 PROCEDURE — 93306 TTE W/DOPPLER COMPLETE: CPT

## 2020-12-04 NOTE — CARD
MR#: [id number]

Account#: [id number]

Accession#: [id number]

Date of Study: 12/04/2020

Ordering Physician: HAIDER WISDOM, 

Referring Physician: HAIDER WISDOM, 

Tech: Brianda Forrester





--------------- APPROVED REPORT --------------





EXAM: Two-dimensional and M-mode echocardiogram with Doppler and color Doppler.



Other Information 

Quality : AverageHR: 108bpm



INDICATION

Atrial Fibrillation



RISK FACTORS

Hypertension 

Hyperlipidemia

Diabetes



2D DIMENSIONS 

RVDd2.9 (2.9-3.5cm)Left Atrium(2D)3.8 (1.6-4.0cm)

IVSd1.2 (0.7-1.1cm)Aortic Root(2D)2.8 (2.0-3.7cm)

LVDd5.7 (3.9-5.9cm)LVOT Diameter1.9 (1.8-2.4cm)

PWd1.3 (0.7-1.1cm)LVDs4.3 (2.5-4.0cm)

FS (%) 24.1 %SV74.3 ml

LVEF(%)47.3 (>50%)



Aortic Valve

AoV Peak Berry.148.0cm/sAoV VTI32.6cm

AO Peak GR.8.8mmHgLVOT Peak Berry.93.8cm/s

LVOT  VTI 24.46cmAO Mean GR.5mmHg

MELO (VMAX)1.83lm3EOB   (VTI)2.24cm2



Mitral Valve

MV E Qpftwxpc356.9cm/sMV E Peak Gr.112mmHg

MV DECEL [id number] A Bdviabbd00.1cm/s

MV [id number]/A  Ratio3.5

MVA (PHT)3.47cm2



TDI

E/Lateral E'26.5E/Medial E'26.8



Pulmonary Valve

PV Peak Fwpcakde09.6cm/sPV Peak Grad.4mmHg



Tricuspid Valve

TR P. Wcnzazil812ou/sRAP TIBXATFZ7vbAm

TR Peak Gr.34qoMrZDEI24zeCh



 LEFT VENTRICLE 

The left ventricle is normal size. There is mild concentric left ventricular hypertrophy. The left ve
ntricular systolic function is normal and the ejection fraction is within normal range. The Ejection 
Fraction is 55%. There is normal LV segmental wall motion. Tissue Doppler imaging reveals moderate le
ft ventricular diastolic dysfunction. No left ventricle thrombus noted on this study.



 RIGHT VENTRICLE 

The right ventricle is borderline dilated. There is normal right ventricular wall thickness. The righ
t ventricular systolic function is normal.



 ATRIA 

The left atrium is borderline dilated. The right atrium is mildly dilated. The interatrial septum is 
intact with no evidence for an atrial septal defect or patent foramen ovale as noted on 2-D or Dopple
r imaging.



 AORTIC VALVE 

The aortic valve is calcified with restricted leaflet motion. Doppler and Color Flow revealed trace t
o mild aortic regurgitation. Calculated aortic valve area is 1.90 cm2 with maximum pressure gradient 
of 11 mmHg and mean pressure gradient of 6 mmHg.



 MITRAL VALVE 

The mitral valve is calcified with restricted leaflet motion. There is no evidence of mitral valve pr
olapse. There is no mitral valve stenosis. Doppler and Color-flow revealed trace to mild mitral regur
gitation.



 TRICUSPID VALVE 

The tricuspid valve is normal in structure and function. Doppler and Color Flow revealed mild to mode
rate tricuspid regurgitation with an estimated PAP of 47 mmHg. There is mild-moderate pulmonary hyper
tension. There is no tricuspid valve stenosis.



 PULMONIC VALVE 

The pulmonic valve is not well visualized. Doppler and Color Flow revealed trace pulmonic valvular re
gurgitation. There is no pulmonic valvular stenosis.



 GREAT VESSELS 

The aortic root is normal in size. The ascending aorta is normal in size. The IVC was not well visual
ized.



 PERICARDIAL EFFUSION 

There is a trace pericardial effusion.



Critical Notification

Critical Value: No



<Conclusion>

The left ventricular systolic function is normal and the ejection fraction is within normal range. Th
e Ejection Fraction is 55%.

There is normal LV segmental wall motion.

Tissue Doppler imaging reveals moderate left ventricular diastolic dysfunction.

Doppler and Color Flow revealed mild to moderate tricuspid regurgitation with an estimated PAP of 47 
mmHg. There is mild-moderate pulmonary hypertension.



Signed by : Haider Wisdom, 

Electronically Approved : 12/04/2020 13:39:39

## 2021-03-31 ENCOUNTER — HOSPITAL ENCOUNTER (INPATIENT)
Dept: HOSPITAL 61 - ER | Age: 86
LOS: 5 days | Discharge: HOME HEALTH | DRG: 516 | End: 2021-04-05
Attending: INTERNAL MEDICINE | Admitting: INTERNAL MEDICINE
Payer: MEDICARE

## 2021-03-31 VITALS — WEIGHT: 165.35 LBS | BODY MASS INDEX: 29.3 KG/M2 | HEIGHT: 63 IN

## 2021-03-31 DIAGNOSIS — Z79.899: ICD-10-CM

## 2021-03-31 DIAGNOSIS — I11.0: ICD-10-CM

## 2021-03-31 DIAGNOSIS — B96.20: ICD-10-CM

## 2021-03-31 DIAGNOSIS — Z87.81: ICD-10-CM

## 2021-03-31 DIAGNOSIS — Z91.040: ICD-10-CM
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
## 2021-03-31 DIAGNOSIS — M62.838: ICD-10-CM

## 2021-03-31 DIAGNOSIS — Z82.49: ICD-10-CM

## 2021-03-31 DIAGNOSIS — E11.9: ICD-10-CM

## 2021-03-31 DIAGNOSIS — Z79.84: ICD-10-CM

## 2021-03-31 DIAGNOSIS — N17.9: ICD-10-CM

## 2021-03-31 DIAGNOSIS — N39.0: ICD-10-CM

## 2021-03-31 DIAGNOSIS — I50.32: ICD-10-CM

## 2021-03-31 DIAGNOSIS — M79.662: ICD-10-CM

## 2021-03-31 DIAGNOSIS — I27.20: ICD-10-CM

## 2021-03-31 DIAGNOSIS — M17.0: ICD-10-CM

## 2021-03-31 DIAGNOSIS — Z66: ICD-10-CM

## 2021-03-31 DIAGNOSIS — M47.816: ICD-10-CM

## 2021-03-31 DIAGNOSIS — Z79.01: ICD-10-CM

## 2021-03-31 DIAGNOSIS — G89.29: ICD-10-CM

## 2021-03-31 DIAGNOSIS — E78.5: ICD-10-CM

## 2021-03-31 DIAGNOSIS — S32.030A: Primary | ICD-10-CM
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
## 2021-03-31 DIAGNOSIS — Z79.82: ICD-10-CM

## 2021-03-31 DIAGNOSIS — M48.061: ICD-10-CM

## 2021-03-31 DIAGNOSIS — I48.19: ICD-10-CM

## 2021-03-31 DIAGNOSIS — E87.6: ICD-10-CM
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
## 2021-03-31 LAB
ALBUMIN SERPL-MCNC: 3.4 G/DL (ref 3.4–5)
ALBUMIN/GLOB SERPL: 0.8 {RATIO} (ref 1–1.7)
ALP SERPL-CCNC: 108 U/L (ref 46–116)
ALT SERPL-CCNC: 17 U/L (ref 14–59)
ANION GAP SERPL CALC-SCNC: 11 MMOL/L (ref 6–14)
AST SERPL-CCNC: 13 U/L (ref 15–37)
BASOPHILS # BLD AUTO: 0 X10^3/UL (ref 0–0.2)
BASOPHILS NFR BLD: 1 % (ref 0–3)
BILIRUB SERPL-MCNC: 0.6 MG/DL (ref 0.2–1)
BUN SERPL-MCNC: 32 MG/DL (ref 7–20)
BUN/CREAT SERPL: 27 (ref 6–20)
CALCIUM SERPL-MCNC: 8.3 MG/DL (ref 8.5–10.1)
CHLORIDE SERPL-SCNC: 100 MMOL/L (ref 98–107)
CO2 SERPL-SCNC: 28 MMOL/L (ref 21–32)
CREAT SERPL-MCNC: 1.2 MG/DL (ref 0.6–1)
EOSINOPHIL NFR BLD: 0 % (ref 0–3)
EOSINOPHIL NFR BLD: 0 X10^3/UL (ref 0–0.7)
ERYTHROCYTE [DISTWIDTH] IN BLOOD BY AUTOMATED COUNT: 15.2 % (ref 11.5–14.5)
GFR SERPLBLD BASED ON 1.73 SQ M-ARVRAT: 42.6 ML/MIN
GLUCOSE SERPL-MCNC: 125 MG/DL (ref 70–99)
HCT VFR BLD CALC: 36.8 % (ref 36–47)
HGB BLD-MCNC: 12.7 G/DL (ref 12–15.5)
LYMPHOCYTES # BLD: 1.4 X10^3/UL (ref 1–4.8)
LYMPHOCYTES NFR BLD AUTO: 16 % (ref 24–48)
MCH RBC QN AUTO: 33 PG (ref 25–35)
MCHC RBC AUTO-ENTMCNC: 35 G/DL (ref 31–37)
MCV RBC AUTO: 96 FL (ref 79–100)
MONO #: 1 X10^3/UL (ref 0–1.1)
MONOCYTES NFR BLD: 11 % (ref 0–9)
NEUT #: 6.2 X10^3/UL (ref 1.8–7.7)
NEUTROPHILS NFR BLD AUTO: 72 % (ref 31–73)
PLATELET # BLD AUTO: 279 X10^3/UL (ref 140–400)
POTASSIUM SERPL-SCNC: 3.4 MMOL/L (ref 3.5–5.1)
PROT SERPL-MCNC: 7.9 G/DL (ref 6.4–8.2)
PROTHROMBIN TIME: 26.3 SEC (ref 11.7–14)
RBC # BLD AUTO: 3.83 X10^6/UL (ref 3.5–5.4)
SODIUM SERPL-SCNC: 139 MMOL/L (ref 136–145)
WBC # BLD AUTO: 8.6 X10^3/UL (ref 4–11)

## 2021-03-31 PROCEDURE — 99153 MOD SED SAME PHYS/QHP EA: CPT

## 2021-03-31 PROCEDURE — C1713 ANCHOR/SCREW BN/BN,TIS/BN: HCPCS

## 2021-03-31 PROCEDURE — 80061 LIPID PANEL: CPT

## 2021-03-31 PROCEDURE — A9503 TC99M MEDRONATE: HCPCS

## 2021-03-31 PROCEDURE — 72148 MRI LUMBAR SPINE W/O DYE: CPT

## 2021-03-31 PROCEDURE — 80048 BASIC METABOLIC PNL TOTAL CA: CPT

## 2021-03-31 PROCEDURE — 99285 EMERGENCY DEPT VISIT HI MDM: CPT

## 2021-03-31 PROCEDURE — 83735 ASSAY OF MAGNESIUM: CPT

## 2021-03-31 PROCEDURE — 85025 COMPLETE CBC W/AUTO DIFF WBC: CPT

## 2021-03-31 PROCEDURE — 93005 ELECTROCARDIOGRAM TRACING: CPT

## 2021-03-31 PROCEDURE — 73590 X-RAY EXAM OF LOWER LEG: CPT

## 2021-03-31 PROCEDURE — G0378 HOSPITAL OBSERVATION PER HR: HCPCS

## 2021-03-31 PROCEDURE — U0003 INFECTIOUS AGENT DETECTION BY NUCLEIC ACID (DNA OR RNA); SEVERE ACUTE RESPIRATORY SYNDROME CORONAVIRUS 2 (SARS-COV-2) (CORONAVIRUS DISEASE [COVID-19]), AMPLIFIED PROBE TECHNIQUE, MAKING USE OF HIGH THROUGHPUT TECHNOLOGIES AS DESCRIBED BY CMS-2020-01-R: HCPCS

## 2021-03-31 PROCEDURE — 99152 MOD SED SAME PHYS/QHP 5/>YRS: CPT

## 2021-03-31 PROCEDURE — 73521 X-RAY EXAM HIPS BI 2 VIEWS: CPT

## 2021-03-31 PROCEDURE — 72131 CT LUMBAR SPINE W/O DYE: CPT

## 2021-03-31 PROCEDURE — 70450 CT HEAD/BRAIN W/O DYE: CPT

## 2021-03-31 PROCEDURE — 82962 GLUCOSE BLOOD TEST: CPT

## 2021-03-31 PROCEDURE — 85610 PROTHROMBIN TIME: CPT

## 2021-03-31 PROCEDURE — 36415 COLL VENOUS BLD VENIPUNCTURE: CPT

## 2021-03-31 PROCEDURE — 80053 COMPREHEN METABOLIC PANEL: CPT

## 2021-03-31 PROCEDURE — 71045 X-RAY EXAM CHEST 1 VIEW: CPT

## 2021-03-31 PROCEDURE — 72125 CT NECK SPINE W/O DYE: CPT

## 2021-03-31 PROCEDURE — 22514 PERQ VERTEBRAL AUGMENTATION: CPT

## 2021-03-31 PROCEDURE — 78306 BONE IMAGING WHOLE BODY: CPT

## 2021-03-31 PROCEDURE — 87426 SARSCOV CORONAVIRUS AG IA: CPT

## 2021-03-31 RX ADMIN — CARVEDILOL SCH MG: 6.25 TABLET, FILM COATED ORAL at 21:50

## 2021-03-31 RX ADMIN — HYDROCODONE BITARTRATE AND ACETAMINOPHEN SCH TAB: 5; 325 TABLET ORAL at 21:50

## 2021-03-31 RX ADMIN — Medication SCH MG: at 19:30

## 2021-03-31 RX ADMIN — ATORVASTATIN CALCIUM SCH MG: 10 TABLET, FILM COATED ORAL at 21:50

## 2021-03-31 NOTE — RAD
CT head without contrast:



Reason for examination: Fell and hit head. On Coumadin.



Helical images were obtained through the brain. No contrast was administered. Reconstruction was perf
ormed in coronal plane.



The ventricular systems are symmetric and not abnormally dilated. No midline shift is seen. There is 
no evidence of intracranial hemorrhage, infarct, mass or edema. There are some mild patchy deep white
 matter changes in the frontal and parietal lobes consistent with some mild microvascular ischemic ch
anges. No abnormalities of seen at the orbits. The paranasal sinuses and mastoid air cells are clear.
 No acute skull abnormality is seen.



IMPRESSION:



No acute intracranial abnormality evident.





CT cervical spine without contrast:



Helical images were obtained through the cervical spine from skull base through the thoracic apices w
ith no contrast administered. Reconstruction was performed in sagittal and coronal planes.



The C1 ring is intact. The odontoid process is intact and normally centered between the lateral michelle
s of C1. The cervical vertebral bodies appear to be normally aligned anteriorly and posteriorly. No a
cute fracture or subluxation is seen. Posterior elements appear to be intact. The intervertebral disc
s are maintained. Prevertebral soft tissues are normal. There does not appear to be significant spina
l stenosis.



IMPRESSION:



No acute abnormality in the cervical spine.





CT lumbar spine without contrast:



Helical images were obtained through the lumbar spine with no intravenous or oral contrast. Reconstru
ction was performed in sagittal and coronal planes.



There is generalized bony demineralization. There appears to be a fracture with minimal displacement 
but mild loss of height at the L3 vertebral body. No other site of fracture is seen. No subluxation i
s seen. Posterior elements are intact. The intervertebral discs show moderate loss of disc height at 
the L5-S1 level with some vacuum phenomena. Remaining intervertebral discs are maintained. There does
 however appear to be moderate stenosis at the L3-4 due to some hypertrophic spurring off the posteri
or inferior endplate of the L3 vertebral body and hypertrophic facet changes. There also appears to b
e moderate stenosis at the L4-5 disc level related to some symmetric disc bulging and facet hypertrop
hy. No abnormalities of seen at the sacrum or sacroiliac joints.



IMPRESSION:



Bony demineralization.

Fracture at the L3 vertebral body without significant displacement of the bone fragments.

Moderate spinal stenosis centrally at the L3-4 and L4-5 disc levels.





Exposure: One or more of the following individualized dose reduction techniques were utilized for thi
s examination:  1. Automated exposure control  2. Adjustment of the mA and/or kV according to patient
 size  3. Use of iterative reconstruction technique.



Electronically signed by: Viry Weiss MD (3/31/2021 3:23 PM) [facility]

## 2021-03-31 NOTE — RAD
EXAM: Frontal pelvis with bilateral two-view hip.



HISTORY: Pain, fall.



COMPARISON: None.



FINDINGS: Osteopenia is moderate to severe. This limits sensitivity for nondisplaced fractures, but n
one are seen. The joint spaces and alignment of both hips are maintained. A calcific density in the l
eft lower quadrant may represent dense material in a diverticulum or old fat necrosis. Atheroscleroti
c calcifications are noted.



IMPRESSION: 

1. No displaced fracture.



Electronically signed by: AUGUSTIN Stone MD (3/31/2021 6:23 PM) [facility]

## 2021-03-31 NOTE — HP
ADMIT DATE:  03/31/2021



LOCATION:  I do not have the room number.



HISTORY OF PRESENT ILLNESS:  The patient is an 86-year-old white female with

history of diabetes mellitus type 2, hypertension, hyperlipidemia who has

persistent atrial fibrillation, treated with Coumadin who has had several week

history of low back pain.  She had an x-ray of the lumbar spine, which showed

some mild lumbar spondylosis.  Was seen by Dr. Nguyen as an outpatient and had a

back brace ordered and used.  Then today while she was in the kitchen, she was

using her grasper to  a pillow and next thing she knew she was on the

floor.  She hit her head.  She sought help at the [facility]

Emergency Room.  In the Emergency Room, a CAT scan of the head and cervical

spine was negative.  CAT scan of the lumbar spine showed an L3 compression

fracture of unknown age and lumbar spinal stenosis that caught got a couple of

levels.  The patient was subsequently admitted to the hospital for intractable

pain and evaluation of her syncopal episode.  She denies any chest pain,

shortness of breath or dizziness prior to the fall.



ALLERGIES AND INTOLERANCES:  INCLUDE LISINOPRIL AND FELODIPINE, AND LATEX.



MEDICATIONS:  Include amlodipine 10 mg every day, aspirin 81 mg every day.  She

did have a recent urinary tract infection and COULD NOT TOLERATE CIPRO, WHICH

CAUSED VISUAL COMPLAINTS and was started on Bactrim double strength and just

took a tablet or so, but she had a pansensitive E. coli urinary tract infection

diagnosed recently. Carvedilol 6.25 mg 1 tablet b.i.d., Flexeril 5 mg t.i.d.

p.r.n., furosemide 40 mg every day, glipizide 2.5 mg every day, Norco 5/325 one

at bedtime, losartan 50 mg every day, lovastatin 40 mg every day, metformin 500

mg b.i.d., MiraLax 17 grams daily, potassium chloride 20 mEq every day, Coumadin

1.5 mg every day.



PAST MEDICAL HISTORY:  Significant for diabetes mellitus type 2, hypertension,

hyperlipidemia, chronic atrial fibrillation, on Coumadin.  She has chronic

diastolic congestive heart failure.  Colon polypectomy; vitrectomy, right eye. 

Cardiac cath showed normal coronary arteries in the year 2000, cataract

extraction, esophageal dilatation, severe pulmonary hypertension and acute

diastolic congestive heart failure in 2017, right humerus fracture in 2019.



SOCIAL HISTORY:  She does not drink alcohol nor does she smoke cigarettes.  She

is .



FAMILY HISTORY:  Noncontributory.



REVIEW OF SYSTEMS:

GENERAL:  She denies any fever, chills or sweats in the last 3 days.

CARDIOVASCULAR:  No chest pain.

PULMONARY:  No cough or shortness of breath.

GASTROINTESTINAL:  No diarrhea.

ENDOCRINE:  She has diabetes mellitus.

SKIN:  No rashes.



Rest of systems reviewed are negative except as stated in history of present

illness.



PHYSICAL EXAMINATION:

VITAL SIGNS:  Temperature is 98.5 degrees.  The blood pressure is 165/71, heart

rate is 101, oxygen saturation 95% on room air, blood pressure is 165/71.

HEENT:  Gaze is conjugate.  Mouth:  Tongue is midline without yeast.

NECK:  No cervical lymphadenopathy or thyroid enlargement.

HEART:  Reveals an S1, S2.  There is no S3 or murmur.

LUNGS:  Clear.

BACK:  Shows no tenderness pinpoint in the lumbar spine.  She has some

tenderness in the lumbar muscles and also over the pelvic bone area.

ABDOMEN:  Soft with no hepatosplenomegaly, masses or tenderness.

EXTREMITIES:  Lower extremities without edema.

SKIN:  No rashes.

NEUROLOGIC:  Coherent.  No focal weakness in the upper or lower extremities.

SKIN:  No rashes.



REVIEW OF LABORATORY TESTS:  The labs were not done.  She had an x-ray of her

left tibia and fibula, which showed no fracture.  She had extensive vascular

calcifications seen.  Then, she had a CAT scan of the lumbar spine, which showed

bony demineralization, fracture L3 vertebral body without significant

displacement, moderate spinal stenosis at L3-L4 and L4-L5.  A CAT scan of the

head and cervical spine.  A CAT scan of the head showed no acute abnormality. 

CAT scan of cervical spine, no acute abnormality.



ASSESSMENT:

1.  Suspected Syncopal episode.

2.  Low back pain for even prior to the fall.

3.  An L3 vertebral fracture, unclear if it is acute or not, but there is no

pinpoint tenderness.

4.  Lumbar spinal stenosis.

5.  Lumbar muscle spasms.

6.  Diabetes mellitus type 2.

7.  Hypertension.

8.  Hyperlipidemia.

9.  Chronic diastolic congestive heart failure.

10.  Persistent atrial fibrillation, on Coumadin.



PLAN:  At this time is to consult Dr. Flores for Neurosurgery.  We will also

consult Dr. Lombardo for Cardiology, put her on telemetry.  Get a CBC, CMP and a

protime and INR.  We will continue with her home medications and order some

morphine sulfate for pain control 1 mg IV every 4 hours p.r.n.  Obtain an x-ray

of the pelvis and on the hips to rule out a fracture.  We will also obtain a

bone scan to see if the L3 compression fracture is new or old and also see if

there is anything else because she is also tender in the pelvic bone.  She is a

do not resuscitate and discussed that in front of the patient and the patient's

family member and she is coherent enough and she wants to be a do not

resuscitate.  We will consult Dr. Nguyen for physical rotation consultation.  We

will order physical and occupational therapy and telemetry.  We will put her on

telemetry as mentioned.  We will get a chest x-ray and an EKG.

 



______________________________

ADRIANA TENORIO MD



DR:  CEASAR/hilario  JOB#:  [id number] / [id number]

DD:  03/31/2021 17:42  DT:  03/31/2021 18:41

## 2021-03-31 NOTE — PDOC
Provider Note


Date of Service:


DATE: 3/31/21 


TIME: 17:43


Provider Note


history and physical dictated  #  [id number]





Justifications for Admission


Other Justification














ADRIANA TENORIO MD           Mar 31, 2021 17:43

## 2021-03-31 NOTE — RAD
Left tibia and fibula 2 views.



HISTORY: Proximal left tibia pain after a fall



2 views were taken of the left tibia and fibula. There is vascular calcification. There is not eviden
ce of an acute fracture or osseous abnormality.



IMPRESSION:

1. Extensive vascular calcification.

2. No fracture or acute osseous abnormality left tibia or fibula.



Electronically signed by: Ruy Bautista MD (3/31/2021 1:50 PM) [id number]

## 2021-03-31 NOTE — RAD
EXAM: CHEST ONE VIEW.



HISTORY: Chest pain, fall.



COMPARISON: 12/14/2019.



FINDINGS: A frontal view of the chest is obtained.



The lungs are expanded to the 11th posterior ribs. There are no confluent infiltrates. There is no pn
eumothorax or pleural effusion. The heart is mildly enlarged given this projection. There are atheros
clerotic calcifications of the aorta. There is a chronic healed fracture of the right proximal humeru
s.



IMPRESSION: 

1. Mild cardiomegaly.

2. Hyperinflation. Correlate for air trapping.



Electronically signed by: AUGUSTIN Stone MD (3/31/2021 6:25 PM) [facility]

## 2021-03-31 NOTE — ED.ADGEN
Past Medical History


Past Medical History:  A-Fib, CHF, Hypertension


Past Surgical History:  No Surgical History


Smoking Status:  Never Smoker


Alcohol Use:  None


Drug Use:  None





General Adult


EDM:


Chief Complaint:  MECHANICAL FALL





HPI:


HPI:





Patient is a 86  year old female who presents emergency department [place] with 

complaints of low back pain, and lower left leg pain after a fall today.  

Patient reports that she does take warfarin for A. fib.  She denies any chest 

pain, dizziness, or syncope.  Patient states that she has had problems with her 

back and for some reason her leg gave out on her.  She states that when she 

landed she ran landed onto her right side.  She denies any vision changes, n

ausea, vomiting, numbness, tingling, or weakness.  She denies any recent fever, 

cough, shortness of breath, abdominal pain, nausea, vomiting, or diarrhea.  She 

denies any dysuria, hematuria, or increased urinary frequency.  The patient 

denies any loss of bowel/ bladder control or saddle anesthesia.  Patient reports

that she has had problems with chronic back pain for a while and recently 

started wearing a abdominal binder for her back.  She currently denies any pain 

at rest.  She states it only hurts if she moves or if her left leg is touched.





Review of Systems:


Review of Systems:


Complete ROS is negative unless otherwise noted in HPI.





Allergies:


Allergies:





Allergies








Coded Allergies Type Severity Reaction Last Updated Verified


 


  latex Allergy Intermediate RASH 17 Yes


 


  felodipine Adverse Reaction Mild COUGH 17 Yes


 


  lisinopril Adverse Reaction Mild COUGH 17 Yes











Physical Exam:


PE:


See Above


Constitutional: Well developed, well nourished, no acute distress, non-toxic 

appearance. []


HENT: Normocephalic, atraumatic, bilateral external ears normal, nose normal. []


Eyes: PERRLA, EOMI, conjunctiva normal, no discharge. [] 


Neck: Normal range of motion, no stridor. [] 


Cardiovascular:Heart rate regular rhythm


Lungs & Thorax:  Respirations even and unlabored, no retractions, no respiratory

 distress


Abdomen: soft, no tenderness


Back: Nonspecific lower back tenderness to palpation, no step-off, no crepitus, 

no obvious deformity,


Skin: Warm, dry, no erythema, no rash. [] 


Extremities: LLE: Tenderness to palpation of the proximal tibia, no edema, no 

crepitus, no obvious deformity, no cyanosis, ROM intact, 1+ edema, sensation 

intact. [] 


Neurologic: Alert and oriented X 3, motor, normal sensory, no focal deficits 

noted. []


Psychologic: Affect normal, judgement normal, mood normal. []





Current Patient Data:


Vital Signs:





                                   Vital Signs








  Date Time  Temp Pulse Resp B/P (MAP) Pulse Ox O2 Delivery O2 Flow Rate FiO2


 


3/31/21 15:31  101  165/71 (102) 95 Room Air  


 


3/31/21 12:55 98.5  18     





 98.5       











EKG:


EK-atrial fibrillation rate 104, no STEMI, read by Dr. Jeong





Heart Score:


C/O Chest Pain:  No


Risk Factors:


Risk Factors:  DM, Current or recent (<one month) smoker, HTN, HLP, family 

history of CAD, obesity.


Risk Scores:


Score 0 - 3:  2.5% MACE over next 6 weeks - Discharge Home


Score 4 - 6:  20.3% MACE over next 6 weeks - Admit for Clinical Observation


Score 7 - 10:  72.7% MACE over next 6 weeks - Early Invasive Strategies





Radiology/Procedures:


Radiology/Procedures:


PROCEDURE: CT LUMBAR SPINE WO CONTRAST





CT head without contrast:





Reason for examination: Fell and hit head. On Coumadin.





Helical images were obtained through the brain. No contrast was administered. 

Reconstruction was performed in coronal plane.





The ventricular systems are symmetric and not abnormally dilated. No midline 

shift is seen. There is no evidence of intracranial hemorrhage, infarct, mass or

 edema. There are some mild patchy deep white matter changes in the frontal and 

parietal lobes consistent with some mild microvascular ischemic changes. No 

abnormalities of seen at the orbits. The paranasal sinuses and mastoid air cells

 are clear. No acute skull abnormality is seen.





IMPRESSION:





No acute intracranial abnormality evident.








CT cervical spine without contrast:





Helical images were obtained through the cervical spine from skull base through 

the thoracic apices with no contrast administered. Reconstruction was performed 

in sagittal and coronal planes.





The C1 ring is intact. The odontoid process is intact and normally centered 

between the lateral masses of C1. The cervical vertebral bodies appear to be 

normally aligned anteriorly and posteriorly. No acute fracture or subluxation is

 seen. Posterior elements appear to be intact. The intervertebral discs are 

maintained. Prevertebral soft tissues are normal. There does not appear to be 

significant spinal stenosis.





IMPRESSION:





No acute abnormality in the cervical spine.








CT lumbar spine without contrast:





Helical images were obtained through the lumbar spine with no intravenous or 

oral contrast. Reconstruction was performed in sagittal and coronal planes.





There is generalized bony demineralization. There appears to be a fracture with 

minimal displacement but mild loss of height at the L3 vertebral body. No other 

site of fracture is seen. No subluxation is seen. Posterior elements are intact.

 The intervertebral discs show moderate loss of disc height at the L5-S1 level 

with some vacuum phenomena. Remaining intervertebral discs are maintained. There

 does however appear to be moderate stenosis at the L3-4 due to some 

hypertrophic spurring off the posterior inferior endplate of the L3 vertebral 

body and hypertrophic facet changes. There also appears to be moderate stenosis 

at the L4-5 disc level related to some symmetric disc bulging and facet 

hypertrophy. No abnormalities of seen at the sacrum or sacroiliac joints.





IMPRESSION:





Bony demineralization.


Fracture at the L3 vertebral body without significant displacement of the bone 

fragments.


Moderate spinal stenosis centrally at the L3-4 and L4-5 disc levels.








Exposure: One or more of the following individualized dose reduction techniques 

were utilized for this examination:  1. Automated exposure control  2. 

Adjustment of the mA and/or kV according to patient size  3. Use of iterative 

reconstruction technique.


[]





PROCEDURE: TIBIA FIBULA LEFT





Left tibia and fibula 2 views.





HISTORY: Proximal left tibia pain after a fall





2 views were taken of the left tibia and fibula. There is vascular 

calcification. There is not evidence of an acute fracture or osseous 

abnormality.





IMPRESSION:


1. Extensive vascular calcification.


2. No fracture or acute osseous abnormality left tibia or fibula.





Electronically signed by: Ruy Bautista MD (3/31/2021 1:50 PM) [id number]





Course & Med Decision Making:


Course & Med Decision Making


Pertinent Labs and Imaging studies reviewed. (See chart for details)


1620-spoke with Dr. Tenorio who is the admitting physician, and care was assumed

 following discussion of patient.  Will admit patient for L3 fracture and low 

back pain.  Will consult neurosurgery as requested.


Patient's vital signs stable. Patient remains afebrile, appears nontoxic, 

respirations even and unlabored. Patient will be admitted to the 

medical/surgical floor. Patient's case and plan of care also discussed with Dr. Mcmahan


[]





Helen Disclaimer:


Dragon Disclaimer:


This electronic medical record was generated, in whole or in part, using a voice

 recognition dictation system.





Departure


Departure


Referrals:  


ADRIANA TENORIO MD (PCP)











GAGAN SMITH       Mar 31, 2021 15:36

## 2021-03-31 NOTE — EKG
[facility]

              [address] [place] [address]

Test Date:    2021               Test Time:    18:23:35

Pat Name:     JUVE MIRZA               Department:   

Patient ID:   [id number]           Room:         2 

Gender:       F                        Technician:   

:          [id number]               Requested By: ADRIANA TENORIO

Order Number: [id number]           Reading MD:     

                                 Measurements

Intervals                              Axis          

Rate:         104                      P:            

AL:                                    QRS:          7

QRSD:         102                      T:            126

QT:           342                                    

QTc:          456                                    

                           Interpretive Statements

IRREGULAR RHYTHM, NO P-WAVE FOUND

VENTRICULAR PREMATURE COMPLEX(ES)

CONSIDER LEFT VENTRICULAR HYPERTROPHY

ST & T ABNORMALITY, CONSIDER

HIGH LATERAL ISCHEMIA OR LEFT VENTRICULAR STRAIN

ABNORMAL ECG

RI6.02

No previous ECG available for comparison

## 2021-03-31 NOTE — RAD
CT head without contrast:



Reason for examination: Fell and hit head. On Coumadin.



Helical images were obtained through the brain. No contrast was administered. Reconstruction was perf
ormed in coronal plane.



The ventricular systems are symmetric and not abnormally dilated. No midline shift is seen. There is 
no evidence of intracranial hemorrhage, infarct, mass or edema. There are some mild patchy deep white
 matter changes in the frontal and parietal lobes consistent with some mild microvascular ischemic ch
anges. No abnormalities of seen at the orbits. The paranasal sinuses and mastoid air cells are clear.
 No acute skull abnormality is seen.



IMPRESSION:



No acute intracranial abnormality evident.





CT cervical spine without contrast:



Helical images were obtained through the cervical spine from skull base through the thoracic apices w
ith no contrast administered. Reconstruction was performed in sagittal and coronal planes.



The C1 ring is intact. The odontoid process is intact and normally centered between the lateral michelle
s of C1. The cervical vertebral bodies appear to be normally aligned anteriorly and posteriorly. No a
cute fracture or subluxation is seen. Posterior elements appear to be intact. The intervertebral disc
s are maintained. Prevertebral soft tissues are normal. There does not appear to be significant spina
l stenosis.



IMPRESSION:



No acute abnormality in the cervical spine.





CT lumbar spine without contrast:



Helical images were obtained through the lumbar spine with no intravenous or oral contrast. Reconstru
ction was performed in sagittal and coronal planes.



There is generalized bony demineralization. There appears to be a fracture with minimal displacement 
but mild loss of height at the L3 vertebral body. No other site of fracture is seen. No subluxation i
s seen. Posterior elements are intact. The intervertebral discs show moderate loss of disc height at 
the L5-S1 level with some vacuum phenomena. Remaining intervertebral discs are maintained. There does
 however appear to be moderate stenosis at the L3-4 due to some hypertrophic spurring off the posteri
or inferior endplate of the L3 vertebral body and hypertrophic facet changes. There also appears to b
e moderate stenosis at the L4-5 disc level related to some symmetric disc bulging and facet hypertrop
hy. No abnormalities of seen at the sacrum or sacroiliac joints.



IMPRESSION:



Bony demineralization.

Fracture at the L3 vertebral body without significant displacement of the bone fragments.

Moderate spinal stenosis centrally at the L3-4 and L4-5 disc levels.





Exposure: One or more of the following individualized dose reduction techniques were utilized for thi
s examination:  1. Automated exposure control  2. Adjustment of the mA and/or kV according to patient
 size  3. Use of iterative reconstruction technique.



Electronically signed by: Viry Weiss MD (3/31/2021 3:23 PM) [facility]

## 2021-04-01 VITALS — DIASTOLIC BLOOD PRESSURE: 67 MMHG | SYSTOLIC BLOOD PRESSURE: 143 MMHG

## 2021-04-01 VITALS — SYSTOLIC BLOOD PRESSURE: 135 MMHG | DIASTOLIC BLOOD PRESSURE: 57 MMHG

## 2021-04-01 VITALS — DIASTOLIC BLOOD PRESSURE: 58 MMHG | SYSTOLIC BLOOD PRESSURE: 132 MMHG

## 2021-04-01 VITALS — DIASTOLIC BLOOD PRESSURE: 59 MMHG | SYSTOLIC BLOOD PRESSURE: 128 MMHG

## 2021-04-01 VITALS — DIASTOLIC BLOOD PRESSURE: 61 MMHG | SYSTOLIC BLOOD PRESSURE: 152 MMHG

## 2021-04-01 LAB
CHOLEST SERPL-MCNC: 139 MG/DL (ref 0–200)
CHOLEST/HDLC SERPL: 2.2 {RATIO}
HDLC SERPL-MCNC: 62 MG/DL (ref 40–60)
LDLC: 66 MG/DL (ref 0–100)
TRIGL SERPL-MCNC: 56 MG/DL (ref 0–150)
VLDLC: 11 MG/DL (ref 0–40)

## 2021-04-01 RX ADMIN — POTASSIUM CHLORIDE SCH MEQ: 1500 TABLET, EXTENDED RELEASE ORAL at 21:57

## 2021-04-01 RX ADMIN — Medication SCH MG: at 16:00

## 2021-04-01 RX ADMIN — CARVEDILOL SCH MG: 6.25 TABLET, FILM COATED ORAL at 08:52

## 2021-04-01 RX ADMIN — POLYETHYLENE GLYCOL 3350 SCH GM: 17 POWDER, FOR SOLUTION ORAL at 08:52

## 2021-04-01 RX ADMIN — CEFTRIAXONE SCH GM: 1 INJECTION, POWDER, FOR SOLUTION INTRAMUSCULAR; INTRAVENOUS at 15:12

## 2021-04-01 RX ADMIN — HYDROCODONE BITARTRATE AND ACETAMINOPHEN SCH TAB: 5; 325 TABLET ORAL at 21:58

## 2021-04-01 RX ADMIN — ATORVASTATIN CALCIUM SCH MG: 10 TABLET, FILM COATED ORAL at 21:57

## 2021-04-01 RX ADMIN — LOSARTAN POTASSIUM SCH MG: 50 TABLET ORAL at 08:53

## 2021-04-01 RX ADMIN — CARVEDILOL SCH MG: 6.25 TABLET, FILM COATED ORAL at 17:17

## 2021-04-01 RX ADMIN — Medication PRN EACH: at 15:54

## 2021-04-01 RX ADMIN — FUROSEMIDE SCH MG: 40 TABLET ORAL at 08:52

## 2021-04-01 NOTE — PDOC2
EDMAR DIAZ APRN 4/1/21 1132:


CARDIAC CONSULT


DATE OF CONSULT


Date of Consult


DATE: 4/1/21 


TIME: 11:26





REASON FOR CONSULT


Reason for Consult:


syncope AFIB





REFERRING PHYSICIAN


Referring Physician:


Dr. Terrell





SOURCE


Source:  Chart review, Patient





HISTORY OF PRESENT ILLNESS


HISTORY OF PRESENT ILLNESS


This is an 87 yo female who presented with complaints of back and LLE pain 

secondary to fall. Patient reports she was transferring from one chair to 

another. Spoke with daughter, Keli, who was at home with her and witnessed the 

fall. Patient was using walker and backed up to the chair. She did not get h

erself completely back to the chair prior to sitting down. Ended up only sitting

down on the right edge of the chair and went off the cushion onto the floor onto

her right ride. Did hit her head on the floor. CT head without acute findings. 

Is no warfarin for AFIB. There was no dizziness, syncope, or LOC.  


Patient presently denies any dizziness, diaphoresis, shortness of breath, or 

nausea/vomiting.


She was diagnosed with UTI 2 days ago and was started on antibiotic therapy





PAST MEDICAL HISTORY


Cardiovascular:  AFIB, CHF, HTN, Hyperlipidemia, Pulmonary hypertension


Musculoskeletal:   low back pain, Osteoarthritis


Endocrine:  Diabetes, Osteoporosis





PAST SURGICAL HISTORY


Past Surgical History:  Cataract Removal





FAMILY HISTORY


Family History:  Hypertension





SOCIAL HISTORY


Smoke:  No


ALCOHOL:  none


Drugs:  None


Lives:  with Family





CURRENT MEDICATIONS


CURRENT MEDICATIONS





Current Medications








 Medications


  (Trade)  Dose


 Ordered  Sig/Navjot


 Route


 PRN Reason  Start Time


 Stop Time Status Last Admin


Dose Admin


 


 Amlodipine


 Besylate


  (Norvasc)  10 mg  DAILY


 PO


   4/1/21 09:00


    4/1/21 08:53





 


 Carvedilol


  (Coreg)  6.25 mg  BIDWMEALS


 PO


   3/31/21 21:00


    4/1/21 08:52





 


 Cyclobenzaprine


 HCl


  (Flexeril)  5 mg  PRN TID  PRN


 PO


 MUSCLE SPASMS  3/31/21 17:15


    3/31/21 21:50





 


 Furosemide


  (Lasix)  40 mg  DAILY


 PO


   4/1/21 09:00


    4/1/21 08:52





 


 Glipizide


  (Glucotrol)  2.5 mg  DAILYWBKFT


 PO


   4/1/21 08:00


    4/1/21 08:00





 


 Acetaminophen/


 Hydrocodone Bitart


  (Lortab 5/325)  1 tab  QHS


 PO


   3/31/21 21:00


    3/31/21 21:50





 


 Losartan Potassium


  (Cozaar)  50 mg  DAILY


 PO


   4/1/21 09:00


    4/1/21 08:53





 


 Atorvastatin


 Calcium


  (Lipitor)  10 mg  QHS


 PO


   3/31/21 21:00


    3/31/21 21:50





 


 Metformin HCl


  (Glucophage)  500 mg  BIDWMEALS


 PO


   4/1/21 08:00


    4/1/21 08:52





 


 Polyethylene


 Glycol


  (miraLAX PACKET)  17 gm  DAILY


 PO


   4/1/21 09:00


    4/1/21 08:52





 


 Potassium Chloride


  (Klor-Con)  20 meq  DAILY


 PO


   4/1/21 09:00


 4/1/21 10:21 DC 4/1/21 08:54





 


 Warfarin Sodium


  (Coumadin)  1.5 mg  DAILY16


 PO


   3/31/21 19:30


    3/31/21 19:30














ALLERGIES


ALLERGIES:  


Coded Allergies:  


     latex (Verified  Allergy, Intermediate, RASH, 4/25/17)


     felodipine (Verified  Adverse Reaction, Mild, COUGH, 4/25/17)


     lisinopril (Verified  Adverse Reaction, Mild, COUGH, 4/25/17)





ROS


Review of System


14 point ROS conducted with pertinent positives noted above in hPI





PHYSICAL EXAM


General:  Alert, Oriented X3, Cooperative, No acute distress


HEENT:  Atraumatic, Mucous membr. moist/pink


Lungs:  Clear to auscultation


Heart:  Other (AFIB- rate controlled )


Abdomen:  Soft, No tenderness


Extremities:  No edema, Normal pulses


Neuro:  Normal speech, Sensation intact


Psych/Mental Status:  Mental status NL, Mood NL


MUSCULOSKELETAL:  Osteoarthritic changes both hands





VITALS/I&O


VITALS/I&O:





                                   Vital Signs








  Date Time  Temp Pulse Resp B/P (MAP) Pulse Ox O2 Delivery O2 Flow Rate FiO2


 


4/1/21 08:53  93  143/67    


 


4/1/21 08:00      Room Air  


 


4/1/21 07:00 96.6  16  92   





 96.6       











LABS


Lab:





                                Laboratory Tests








Test


 3/31/21


17:25 3/31/21


19:41 4/1/21


03:10 4/1/21


07:18


 


White Blood Count


 8.6 x10^3/uL


(4.0-11.0) 


 


 





 


Red Blood Count


 3.83 x10^6/uL


(3.50-5.40) 


 


 





 


Hemoglobin


 12.7 g/dL


(12.0-15.5) 


 


 





 


Hematocrit


 36.8 %


(36.0-47.0) 


 


 





 


Mean Corpuscular Volume


 96 fL ()


 


 


 





 


Mean Corpuscular Hemoglobin 33 pg (25-35)     


 


Mean Corpuscular Hemoglobin


Concent 35 g/dL


(31-37) 


 


 





 


Red Cell Distribution Width


 15.2 %


(11.5-14.5)  H 


 


 





 


Platelet Count


 279 x10^3/uL


(140-400) 


 


 





 


Neutrophils (%) (Auto) 72 % (31-73)     


 


Lymphocytes (%) (Auto) 16 % (24-48)  L   


 


Monocytes (%) (Auto) 11 % (0-9)  H   


 


Eosinophils (%) (Auto) 0 % (0-3)     


 


Basophils (%) (Auto) 1 % (0-3)     


 


Neutrophils # (Auto)


 6.2 x10^3/uL


(1.8-7.7) 


 


 





 


Lymphocytes # (Auto)


 1.4 x10^3/uL


(1.0-4.8) 


 


 





 


Monocytes # (Auto)


 1.0 x10^3/uL


(0.0-1.1) 


 


 





 


Eosinophils # (Auto)


 0.0 x10^3/uL


(0.0-0.7) 


 


 





 


Basophils # (Auto)


 0.0 x10^3/uL


(0.0-0.2) 


 


 





 


Prothrombin Time


 26.3 SEC


(11.7-14.0)  H 


 


 





 


Prothrombin Time INR


 2.4 (0.8-1.1)


H 


 


 





 


Sodium Level


 139 mmol/L


(136-145) 


 


 





 


Potassium Level


 3.4 mmol/L


(3.5-5.1)  L 


 


 





 


Chloride Level


 100 mmol/L


() 


 


 





 


Carbon Dioxide Level


 28 mmol/L


(21-32) 


 


 





 


Anion Gap 11 (6-14)     


 


Blood Urea Nitrogen


 32 mg/dL


(7-20)  H 


 


 





 


Creatinine


 1.2 mg/dL


(0.6-1.0)  H 


 


 





 


Estimated GFR


(Cockcroft-Gault) 42.6  


 


 


 





 


BUN/Creatinine Ratio 27 (6-20)  H   


 


Glucose Level


 125 mg/dL


(70-99)  H 


 


 





 


Calcium Level


 8.3 mg/dL


(8.5-10.1)  L 


 


 





 


Total Bilirubin


 0.6 mg/dL


(0.2-1.0) 


 


 





 


Aspartate Amino Transferase


(AST) 13 U/L (15-37)


L 


 


 





 


Alanine Aminotransferase (ALT)


 17 U/L (14-59)


 


 


 





 


Alkaline Phosphatase


 108 U/L


() 


 


 





 


Total Protein


 7.9 g/dL


(6.4-8.2) 


 


 





 


Albumin


 3.4 g/dL


(3.4-5.0) 


 


 





 


Albumin/Globulin Ratio


 0.8 (1.0-1.7)


L 


 


 





 


Glucose (Fingerstick)


 


 132 mg/dL


(70-99)  H 


 93 mg/dL


(70-99)


 


Triglycerides Level


 


 


 56 mg/dL


(0-150) 





 


Cholesterol Level


 


 


 139 mg/dL


(0-200) 





 


LDL Cholesterol, Calculated


 


 


 66 mg/dL


(0-100) 





 


VLDL Cholesterol, Calculated


 


 


 11 mg/dL


(0-40) 





 


Non-HDL Cholesterol Calculated


 


 


 77 mg/dL


(0-129) 





 


HDL Cholesterol


 


 


 62 mg/dL


(40-60)  H 





 


Cholesterol/HDL Ratio   2.2   





                                Laboratory Tests


3/31/21 17:25








                                Laboratory Tests


3/31/21 17:25











ECHOCARDIOGRAM


ECHOCARDIOGRAM


<Conclusion>


The left ventricular systolic function is normal and the ejection fraction is 

within normal range. The Ejection Fraction is 55%.


There is normal LV segmental wall motion.


Tissue Doppler imaging reveals moderate left ventricular diastolic dysfunction.


Doppler and Color Flow revealed mild to moderate tricuspid regurgitation with an

estimated PAP of 47 mmHg. There is mild-moderate pulmonary hypertension.





DATE:     12/04/20 [id number] 0





ASSESSMENT/PLAN


ASSESSMENT/PLAN


1.  Mechanical fall with low back and LLE pain; patient did not back up to chair

completely prior to sitting and fell off the front of the chair to the ground. 

Daughter present at fall, but was not able to break. No dizziness, syncope, or 

LOC.  CT head without acute finding. CT lumber spine with nondisplaced L3 

vertebral body fracture. No acute hip or pelvis fracture


2.  Persistent AFIB; rate controlled. on warfarin for stroke prophylaxis. INR 

2.4


3.  Chronic diastolic CHF; clinically compensated. Echo 12/20 with preserved LV 

systolic function as noted above.


4.  Hypertension; controlled


5.  Hyperlipidemia


6.  Recent UTI


7.  CHE


8.  Hypokalemia; replaced 


9.  Moderate pulm HTN








Recommendations





Continue BB for rate control


Risks versus benefits of OAC discussed with patient and daughter. They would 

like to continue for now. If recurrent falls, would recommend discontinuing OAC 

and placing on ASA therapy alone.


Okay to hold until seen by neurosurgery 


Supportive care from a CV standpoint


No further inpatient testing at this time.





HAIDER GLOVER MD 4/1/21 1456:


CARDIAC CONSULT


ASSESSMENT/PLAN


ASSESSMENT/PLAN


The patient was seen and interviewed as well as examined at the bedside. The 

chart was reviewed. The case was discussed. Agree with the plan of care.











EDMAR DIAZ            Apr 1, 2021 11:32


HAIDER GLOVER MD        Apr 1, 2021 14:56

## 2021-04-01 NOTE — PDOC
PROGRESS NOTES


Date of Service


DATE: 4/1/21 


TIME: 10:14





Subjective


Subjective


has pain in lower back with turning in bed. x ray of hip and pelvis negative for

rx. MRI of lumbar spine shows spinal stenosis and L3 vertebral fx age 

indeterminate. bone scan ordered. PT and OT and neurosurgery and physiatrist 

consult,. potassium 3.4. inr 2..  bun 32 and creatinine 1.2.





Objective


Objective





Vital Signs








  Date Time  Temp Pulse Resp B/P (MAP) Pulse Ox O2 Delivery O2 Flow Rate FiO2


 


4/1/21 08:53  93  143/67    


 


4/1/21 07:00 96.6  16  92 Room Air  





 96.6       











Physical Exam


Abdomen:  Soft


Heart:  Normal S1, Normal S2


Extremities:  No edema


General:  Alert


HEENT:  Atraumatic


Neuro:  Normal speech


Psych/Mental Status:  Mental status NL


Skin:  No rashes





Assessment


Assessment


Problems  Suspected Syncopal episode.


2.  Low back pain  even prior to the fall.


3.   L3 vertebral fracture, unclear if it is acute or not, but there is no


pinpoint tenderness.


4.  Lumbar spinal stenosis.


5.  Lumbar muscle spasms.


6.  Diabetes mellitus type 2.


7.  Hypertension.


8.  Hyperlipidemia.


9.  Chronic diastolic congestive heart failure.


10.  Persistent atrial fibrillation, on Coumadin.


      disuse myopathy


Medical Problems:


(1) Back pain


Status: Acute  








Plan


Plan of Care


telemetry


cardiology consult


continue coumadin.


continue analgesics


PT and OT


neurosurgery and physiatrist consults


continue glipizide and metformin


continue furosemide and increase kcl


continue amlodipine and carvedilol and losartan


continue atorvastatin substitute for lovastatin 40 mg daily


bone scan





Comment


Review of Relevant


I have reviewed the following items arianna (where applicable) has been applied.


Labs





Laboratory Tests








Test


 3/31/21


17:25 3/31/21


19:41 4/1/21


03:10 4/1/21


07:18


 


White Blood Count


 8.6 x10^3/uL


(4.0-11.0) 


 


 





 


Red Blood Count


 3.83 x10^6/uL


(3.50-5.40) 


 


 





 


Hemoglobin


 12.7 g/dL


(12.0-15.5) 


 


 





 


Hematocrit


 36.8 %


(36.0-47.0) 


 


 





 


Mean Corpuscular Volume 96 fL ()    


 


Mean Corpuscular Hemoglobin 33 pg (25-35)    


 


Mean Corpuscular Hemoglobin


Concent 35 g/dL


(31-37) 


 


 





 


Red Cell Distribution Width


 15.2 %


(11.5-14.5) 


 


 





 


Platelet Count


 279 x10^3/uL


(140-400) 


 


 





 


Neutrophils (%) (Auto) 72 % (31-73)    


 


Lymphocytes (%) (Auto) 16 % (24-48)    


 


Monocytes (%) (Auto) 11 % (0-9)    


 


Eosinophils (%) (Auto) 0 % (0-3)    


 


Basophils (%) (Auto) 1 % (0-3)    


 


Neutrophils # (Auto)


 6.2 x10^3/uL


(1.8-7.7) 


 


 





 


Lymphocytes # (Auto)


 1.4 x10^3/uL


(1.0-4.8) 


 


 





 


Monocytes # (Auto)


 1.0 x10^3/uL


(0.0-1.1) 


 


 





 


Eosinophils # (Auto)


 0.0 x10^3/uL


(0.0-0.7) 


 


 





 


Basophils # (Auto)


 0.0 x10^3/uL


(0.0-0.2) 


 


 





 


Prothrombin Time


 26.3 SEC


(11.7-14.0) 


 


 





 


Prothromb Time International


Ratio 2.4 (0.8-1.1) 


 


 


 





 


Sodium Level


 139 mmol/L


(136-145) 


 


 





 


Potassium Level


 3.4 mmol/L


(3.5-5.1) 


 


 





 


Chloride Level


 100 mmol/L


() 


 


 





 


Carbon Dioxide Level


 28 mmol/L


(21-32) 


 


 





 


Anion Gap 11 (6-14)    


 


Blood Urea Nitrogen


 32 mg/dL


(7-20) 


 


 





 


Creatinine


 1.2 mg/dL


(0.6-1.0) 


 


 





 


Estimated GFR


(Cockcroft-Gault) 42.6 


 


 


 





 


BUN/Creatinine Ratio 27 (6-20)    


 


Glucose Level


 125 mg/dL


(70-99) 


 


 





 


Calcium Level


 8.3 mg/dL


(8.5-10.1) 


 


 





 


Total Bilirubin


 0.6 mg/dL


(0.2-1.0) 


 


 





 


Aspartate Amino Transf


(AST/SGOT) 13 U/L (15-37) 


 


 


 





 


Alanine Aminotransferase


(ALT/SGPT) 17 U/L (14-59) 


 


 


 





 


Alkaline Phosphatase


 108 U/L


() 


 


 





 


Total Protein


 7.9 g/dL


(6.4-8.2) 


 


 





 


Albumin


 3.4 g/dL


(3.4-5.0) 


 


 





 


Albumin/Globulin Ratio 0.8 (1.0-1.7)    


 


Glucose (Fingerstick)


 


 132 mg/dL


(70-99) 


 93 mg/dL


(70-99)


 


Triglycerides Level


 


 


 56 mg/dL


(0-150) 





 


Cholesterol Level


 


 


 139 mg/dL


(0-200) 





 


LDL Cholesterol, Calculated


 


 


 66 mg/dL


(0-100) 





 


VLDL Cholesterol, Calculated


 


 


 11 mg/dL


(0-40) 





 


Non-HDL Cholesterol Calculated


 


 


 77 mg/dL


(0-129) 





 


HDL Cholesterol


 


 


 62 mg/dL


(40-60) 





 


Cholesterol/HDL Ratio   2.2  








Laboratory Tests








Test


 3/31/21


17:25 3/31/21


19:41 4/1/21


03:10 4/1/21


07:18


 


White Blood Count


 8.6 x10^3/uL


(4.0-11.0) 


 


 





 


Red Blood Count


 3.83 x10^6/uL


(3.50-5.40) 


 


 





 


Hemoglobin


 12.7 g/dL


(12.0-15.5) 


 


 





 


Hematocrit


 36.8 %


(36.0-47.0) 


 


 





 


Mean Corpuscular Volume 96 fL ()    


 


Mean Corpuscular Hemoglobin 33 pg (25-35)    


 


Mean Corpuscular Hemoglobin


Concent 35 g/dL


(31-37) 


 


 





 


Red Cell Distribution Width


 15.2 %


(11.5-14.5) 


 


 





 


Platelet Count


 279 x10^3/uL


(140-400) 


 


 





 


Neutrophils (%) (Auto) 72 % (31-73)    


 


Lymphocytes (%) (Auto) 16 % (24-48)    


 


Monocytes (%) (Auto) 11 % (0-9)    


 


Eosinophils (%) (Auto) 0 % (0-3)    


 


Basophils (%) (Auto) 1 % (0-3)    


 


Neutrophils # (Auto)


 6.2 x10^3/uL


(1.8-7.7) 


 


 





 


Lymphocytes # (Auto)


 1.4 x10^3/uL


(1.0-4.8) 


 


 





 


Monocytes # (Auto)


 1.0 x10^3/uL


(0.0-1.1) 


 


 





 


Eosinophils # (Auto)


 0.0 x10^3/uL


(0.0-0.7) 


 


 





 


Basophils # (Auto)


 0.0 x10^3/uL


(0.0-0.2) 


 


 





 


Prothrombin Time


 26.3 SEC


(11.7-14.0) 


 


 





 


Prothromb Time International


Ratio 2.4 (0.8-1.1) 


 


 


 





 


Sodium Level


 139 mmol/L


(136-145) 


 


 





 


Potassium Level


 3.4 mmol/L


(3.5-5.1) 


 


 





 


Chloride Level


 100 mmol/L


() 


 


 





 


Carbon Dioxide Level


 28 mmol/L


(21-32) 


 


 





 


Anion Gap 11 (6-14)    


 


Blood Urea Nitrogen


 32 mg/dL


(7-20) 


 


 





 


Creatinine


 1.2 mg/dL


(0.6-1.0) 


 


 





 


Estimated GFR


(Cockcroft-Gault) 42.6 


 


 


 





 


BUN/Creatinine Ratio 27 (6-20)    


 


Glucose Level


 125 mg/dL


(70-99) 


 


 





 


Calcium Level


 8.3 mg/dL


(8.5-10.1) 


 


 





 


Total Bilirubin


 0.6 mg/dL


(0.2-1.0) 


 


 





 


Aspartate Amino Transf


(AST/SGOT) 13 U/L (15-37) 


 


 


 





 


Alanine Aminotransferase


(ALT/SGPT) 17 U/L (14-59) 


 


 


 





 


Alkaline Phosphatase


 108 U/L


() 


 


 





 


Total Protein


 7.9 g/dL


(6.4-8.2) 


 


 





 


Albumin


 3.4 g/dL


(3.4-5.0) 


 


 





 


Albumin/Globulin Ratio 0.8 (1.0-1.7)    


 


Glucose (Fingerstick)


 


 132 mg/dL


(70-99) 


 93 mg/dL


(70-99)


 


Triglycerides Level


 


 


 56 mg/dL


(0-150) 





 


Cholesterol Level


 


 


 139 mg/dL


(0-200) 





 


LDL Cholesterol, Calculated


 


 


 66 mg/dL


(0-100) 





 


VLDL Cholesterol, Calculated


 


 


 11 mg/dL


(0-40) 





 


Non-HDL Cholesterol Calculated


 


 


 77 mg/dL


(0-129) 





 


HDL Cholesterol


 


 


 62 mg/dL


(40-60) 





 


Cholesterol/HDL Ratio   2.2  








Medications





Current Medications


Ondansetron HCl (Zofran) 4 mg PRN Q8HRS  PRN IV NAUSEA/VOMITING;  Start 3/31/21 

at 16:30;  Stop 4/1/21 at 16:29


Morphine Sulfate (Morphine Sulfate) 2 mg PRN Q2HR  PRN IV PAIN;  Start 3/31/21 

at 16:30;  Stop 3/31/21 at 17:45;  Status DC


Morphine Sulfate (Morphine Sulfate) 1 mg PRN Q4HRS  PRN IV SEVERE PAIN 7-10;  

Start 3/31/21 at 17:15


Amlodipine Besylate (Norvasc) 10 mg DAILY PO  Last administered on 4/1/21at 

08:53;  Start 4/1/21 at 09:00


Carvedilol (Coreg) 6.25 mg BIDWMEALS PO  Last administered on 4/1/21at 08:52;  

Start 3/31/21 at 21:00


Cyclobenzaprine HCl (Flexeril) 5 mg PRN TID  PRN PO MUSCLE SPASMS Last 

administered on 3/31/21at 21:50;  Start 3/31/21 at 17:15


Furosemide (Lasix) 40 mg DAILY PO  Last administered on 4/1/21at 08:52;  Start 

4/1/21 at 09:00


Glipizide (Glucotrol) 2.5 mg DAILYWBKFT PO  Last administered on 4/1/21at 08:00;

 Start 4/1/21 at 08:00


Acetaminophen/ Hydrocodone Bitart (Lortab 5/325) 1 tab QHS PO  Last administered

on 3/31/21at 21:50;  Start 3/31/21 at 21:00


Acetaminophen/ Hydrocodone Bitart (Lortab 5/325) 1 tab PRN Q4HRS  PRN PO SEVERE 

PAIN 7-10;  Start 3/31/21 at 17:15


Losartan Potassium (Cozaar) 50 mg DAILY PO  Last administered on 4/1/21at 08:53;

 Start 4/1/21 at 09:00


Atorvastatin Calcium (Lipitor) 10 mg QHS PO  Last administered on 3/31/21at 

21:50;  Start 3/31/21 at 21:00


Metformin HCl (Glucophage) 500 mg BIDWMEALS PO  Last administered on 4/1/21at 

08:52;  Start 4/1/21 at 08:00


Polyethylene Glycol (miraLAX PACKET) 17 gm DAILY PO  Last administered on 

4/1/21at 08:52;  Start 4/1/21 at 09:00


Potassium Chloride (Klor-Con) 20 meq DAILY PO  Last administered on 4/1/21at 

08:54;  Start 4/1/21 at 09:00


Warfarin Sodium (Coumadin) 1.5 mg DAILY16 PO  Last administered on 3/31/21at 

19:30;  Start 3/31/21 at 19:30


Acetaminophen (Tylenol) 650 mg PRN Q6HRS  PRN PO MILD PAIN / TEMP > 100.3'F;  

Start 3/31/21 at 17:15


Magnesium Hydroxide (Milk Of Magnesia) 2,400 mg PRN DAILY  PRN PO CONSTIPATION; 

Start 3/31/21 at 17:15


Warfarin Sodium (Coumadin Per Physician) 1 each PRN DAILY  PRN MC SEE COMMENTS; 

Start 3/31/21 at 18:30





Active Scripts


Active


Hydrocodone-Apap 5-325  ** (Hydrocodone Bit/Acetaminophen) 1 Tab Tablet 1 Tab PO

PRN Q4HRS PRN


Coumadin (Warfarin Sodium) 2.5 Mg Tablet 2 Mg PO DAILY16


Furosemide 40 Mg Tablet 40 Mg PO DAILY


Reported


Tramadol Hcl 50 Mg Tablet 1 Tab PO PRN Q6HRS PRN


Carvedilol 25 Mg Tablet 6.25 Mg PO BIDWMEALS


Aspirin Ec (Aspirin) 81 Mg Tablet.dr 81 Mg PO 


Potassium Chloride ** (Potassium Chloride) 20 Meq Tablet.er 20 Meq PO DAILY


Lovastatin 40 Mg Tablet 40 Mg PO HS


Metformin Hcl 500 Mg Tablet 500 Mg PO BIDWMEALS


Tylenol (Acetaminophen) 325 Mg Tablet 1-2 Tab PO PRN Q4HRS


Losartan Potassium 100 Mg Tablet 50 Mg PO DAILY


Glipizide 5 Mg Tablet 5 Mg PO DAILY


Amlodipine Besylate 10 Mg Tablet 10 Mg PO DAILY


Vitals/I & O





Vital Sign - Last 24 Hours








 3/31/21 3/31/21 3/31/21 3/31/21





 12:55 13:00 13:14 13:30


 


Temp 98.5   





 98.5   


 


Pulse 101 108 121 104


 


Resp 18   


 


B/P (MAP) 146/102 (117) 146/107 (120) 166/67 (100) 156/73 (100)


 


Pulse Ox 99 97 97 96


 


O2 Delivery Room Air Room Air Room Air Room Air


 


    





    





 3/31/21 3/31/21 3/31/21 3/31/21





 14:31 15:01 15:31 20:00


 


Pulse 109 115 101 


 


B/P (MAP) 172/67 (102) 178/69 (105) 165/71 (102) 


 


Pulse Ox 95 96 95 


 


O2 Delivery Room Air Room Air Room Air Room Air





 3/31/21 3/31/21 3/31/21 4/1/21





 21:50 21:50 22:50 03:00


 


Temp    98.6





    98.6


 


Pulse 101   95


 


B/P (MAP) 165/71   135/57 (83)


 


Pulse Ox  95 95 96


 


O2 Delivery    Room Air


 


    





    





 4/1/21 4/1/21 4/1/21 4/1/21





 07:00 08:52 08:53 08:53


 


Temp 96.6   





 96.6   


 


Pulse 84 93 93 93


 


Resp 16   


 


B/P (MAP) 143/67 (92) 143/67 143/67 143/67


 


Pulse Ox 92   


 


O2 Delivery Room Air   











Justifications for Admission


Other Justification














ADRIANA TENORIO MD            Apr 1, 2021 10:19

## 2021-04-01 NOTE — CONS
DATE OF CONSULTATION:  04/01/2021



ATTENDING PHYSICIAN:  Jeff Terrell MD



REASON FOR CONSULTATION:  The patient was seen at the request of Dr. Terrell for

rehab evaluation.



LOCATION:  She is in room 672.



HISTORY OF PRESENT ILLNESS:  This is an 86-year-old right-handed female known to

me from the last couple of months.  The patient with diabetes mellitus type 2,

hypertension, hyperlipidemia, persistent atrial fibrillation, on

anticoagulation.  The patient with lower back pain for the last 4-5 weeks,

started after she had a fall.  X-rays of lumbar spine revealed mild lumbar

spondylosis.  I saw her in the office a couple of times.  I have injected

painful trigger points over right lumbar paraspinal muscles with some help and

she came the following week, and I have injected painful right sacroiliac joint

area and her daughter called me yesterday after she fell in the kitchen while

trying to grasp a pillow.  The patient had multiple radiological studies, which

revealed multilevel degenerative disk disease and degenerative joint disease of

cervical and lumbar vertebrae with L3 vertebral body compression fractures of

unknown age and lumbar spinal stenosis at couple of levels.  The patient was

admitted for further evaluation and treatment.  This morning, she feels better. 

She admits pain mainly when she is moving.  She had lumbar corset given an

outpatient basis.



ALLERGIES:  THE PATIENT IS KNOWN ALLERGIC TO LISINOPRIL, FELODIPINE, AND LATEX.



PAST MEDICAL HISTORY:  The patient also had history of chronic congestive heart

failure diastolic, status post colon polypectomy, vitrectomy right eye, cataract

extraction, esophageal dilatation, severe pulmonary hypertension, right humerus

fracture in 2019.



SOCIAL HISTORY:  She lives alone.  The patient had a roller walker to use.



PHYSICAL EXAMINATION:

GENERAL:  Today revealed an elderly female.  She is alert, oriented to time,

place, person, circumstance and follows commands appropriately.

EXTREMITIES:  Moves all 4 extremities voluntarily where she had 4+/5 grade

muscle strength.  Deep tendon reflexes are 1-2+ and symmetrical in upper

extremities, decreased to absent in her lower extremities.  She had equal

perception of touch and pinprick sensation bilaterally.  She had crepitus on

range of motion of her knee joints.  No significant pain on hip joint range of

motion.  She had tenderness to palpation over sacroiliac joint area and

adjoining lower lumbar paraspinal muscles, mainly on the right side.  Straight

leg raising test is negative bilaterally.  Her skin is intact at this time.  I

have not tested her transfers or ambulation skills at this time, as she does not

have a lumbar corset in place.



ASSESSMENT:  Elderly female with recent fall and radiological evidence of L3

vertebral body compression fracture in a patient with chronic lower back pain

from degenerative disk disease and degenerative joint disease of lumbar

vertebrae without any clinical evidence of ongoing lumbar radiculopathy,

clinical evidence of peripheral neuropathy from diabetes mellitus, degenerative

joint disease of both knees.  The patient with known hypertension,

hyperlipidemia, chronic atrial fibrillation, on anticoagulation, chronic

diastolic congestive heart failure.



RECOMMENDATION:  Await bone scan results.  If there is new compression fracture,

to proceed with kyphoplasty of L3 vertebral body to get her up with lumbar

corset as tolerated.  Hopefully, home when medically stable with outpatient

followup.



Dr. Terrell, I appreciate asking me to participate in the care of this

interesting patient.  I will be glad to see her for followup with you on as

needed basis.

 



______________________________

RYAN TRIPP MD



DR:  HARJIT/hilario  JOB#:  [id number] / [id number]

DD:  04/01/2021 11:07  DT:  04/01/2021 15:38

## 2021-04-01 NOTE — RAD
NM BONE SCAN WHOLE BODY



History: Reason: back and pelvic bone pain. L# fx possible acute / Spl. Instructions:  / History: 



Comparison: CT March 31, 2021.



Technique: Examination performed after intravenous administration of 25 mCi Technetium 99m MDP.  Imag
es of the whole body were obtained in the anterior and posterior projections.



Findings:  Increased uptake within the L3 vertebral body and the region of the known fracture. Increa
sed uptake within the foot, wrists, elbows and shoulders likely related to degenerative changes. Phys
iologic uptake within the kidneys and urinary bladder.



Impression: 

1.  Increased radiotracer uptake within acute L3 vertebral body fracture. 



Electronically signed by: Roberto Robles DO (4/1/2021 5:12 PM) [id number]

## 2021-04-02 VITALS — SYSTOLIC BLOOD PRESSURE: 144 MMHG | DIASTOLIC BLOOD PRESSURE: 63 MMHG

## 2021-04-02 VITALS — SYSTOLIC BLOOD PRESSURE: 179 MMHG | DIASTOLIC BLOOD PRESSURE: 74 MMHG

## 2021-04-02 VITALS — DIASTOLIC BLOOD PRESSURE: 70 MMHG | SYSTOLIC BLOOD PRESSURE: 170 MMHG

## 2021-04-02 VITALS — DIASTOLIC BLOOD PRESSURE: 55 MMHG | SYSTOLIC BLOOD PRESSURE: 127 MMHG

## 2021-04-02 VITALS — DIASTOLIC BLOOD PRESSURE: 56 MMHG | SYSTOLIC BLOOD PRESSURE: 123 MMHG

## 2021-04-02 VITALS — DIASTOLIC BLOOD PRESSURE: 65 MMHG | SYSTOLIC BLOOD PRESSURE: 135 MMHG

## 2021-04-02 LAB
ANION GAP SERPL CALC-SCNC: 7 MMOL/L (ref 6–14)
BUN SERPL-MCNC: 26 MG/DL (ref 7–20)
CALCIUM SERPL-MCNC: 8.9 MG/DL (ref 8.5–10.1)
CHLORIDE SERPL-SCNC: 104 MMOL/L (ref 98–107)
CO2 SERPL-SCNC: 29 MMOL/L (ref 21–32)
CREAT SERPL-MCNC: 1.1 MG/DL (ref 0.6–1)
GFR SERPLBLD BASED ON 1.73 SQ M-ARVRAT: 47.1 ML/MIN
GLUCOSE SERPL-MCNC: 72 MG/DL (ref 70–99)
MAGNESIUM SERPL-MCNC: 1.9 MG/DL (ref 1.8–2.4)
POTASSIUM SERPL-SCNC: 4.2 MMOL/L (ref 3.5–5.1)
PROTHROMBIN TIME: 23.3 SEC (ref 11.7–14)
SODIUM SERPL-SCNC: 140 MMOL/L (ref 136–145)

## 2021-04-02 RX ADMIN — HYDROCODONE BITARTRATE AND ACETAMINOPHEN SCH TAB: 5; 325 TABLET ORAL at 21:42

## 2021-04-02 RX ADMIN — POLYETHYLENE GLYCOL 3350 SCH GM: 17 POWDER, FOR SOLUTION ORAL at 14:06

## 2021-04-02 RX ADMIN — FUROSEMIDE SCH MG: 40 TABLET ORAL at 14:05

## 2021-04-02 RX ADMIN — CARVEDILOL SCH MG: 6.25 TABLET, FILM COATED ORAL at 17:15

## 2021-04-02 RX ADMIN — Medication SCH CAP: at 21:42

## 2021-04-02 RX ADMIN — POTASSIUM CHLORIDE SCH MEQ: 1500 TABLET, EXTENDED RELEASE ORAL at 21:42

## 2021-04-02 RX ADMIN — CEFTRIAXONE SCH GM: 1 INJECTION, POWDER, FOR SOLUTION INTRAMUSCULAR; INTRAVENOUS at 14:04

## 2021-04-02 RX ADMIN — CARVEDILOL SCH MG: 6.25 TABLET, FILM COATED ORAL at 08:00

## 2021-04-02 RX ADMIN — LOSARTAN POTASSIUM SCH MG: 50 TABLET ORAL at 14:05

## 2021-04-02 RX ADMIN — ATORVASTATIN CALCIUM SCH MG: 10 TABLET, FILM COATED ORAL at 21:42

## 2021-04-02 RX ADMIN — POTASSIUM CHLORIDE SCH MEQ: 1500 TABLET, EXTENDED RELEASE ORAL at 14:06

## 2021-04-02 NOTE — NUR
[facility]E following for discharge planning. [facility] Pt on an ADA diet and 
room air. Discharge plan remains home with resumption of  services. [facility] Possible weekend discharge. TERRIE added pt to weekend discharge list. 

-------------------------------------------------------------------------------

Addendum: 04/05/21 at 1100 by KATIE FALCON

-------------------------------------------------------------------------------

Pt transferred to .

## 2021-04-02 NOTE — PDOC
PROGRESS NOTES


Date of Service


DATE: 4/2/21 


TIME: 10:03





Subjective


Subjective


discussed with daughter. bone scan  with positive uptake L3 and will consult IR 

for a vertebroplasty. patient concurs. pain with turning in bed and has mobility

deficits





Objective


Objective





Vital Signs








  Date Time  Temp Pulse Resp B/P (MAP) Pulse Ox O2 Delivery O2 Flow Rate FiO2


 


4/2/21 07:00 96.7 103 22 135/65 (88) 96 Room Air  





 96.7       











Physical Exam


Abdomen:  Soft


Heart:  Regular rate, Normal S1, Normal S2


Extremities:  No edema


General:  Alert


HEENT:  Atraumatic


Lungs:  Clear to auscultation


Neuro:  Normal speech


Psych/Mental Status:  Mood NL


Skin:  No rashes





Assessment


Assessment


Problems no syncope per cardiology. daughter witnessed fall and is a mechanical 

fall


2.  Low back pain  even prior to the fall.


3.  Acute  L3 vertebral fracture,


4.  Lumbar spinal stenosis.


5.  Lumbar muscle spasms.


6.  Diabetes mellitus type 2.


7.  Hypertension.


8.  Hyperlipidemia.


9.  Chronic diastolic congestive heart failure.


10.  Persistent atrial fibrillation, on Coumadin.


      disuse myopathy


Medical Problems:


(1) Back pain


Status: Acute  








Plan


Plan of Care


consult IR for L3 vertebroplasty


analgesics . iv morphine or norco prn


telemetry


PT and OT


vitamin D





Comment


Review of Relevant


I have reviewed the following items arianna (where applicable) has been applied.


Labs





Laboratory Tests








Test


 3/31/21


17:25 3/31/21


19:41 4/1/21


03:10 4/1/21


07:18


 


White Blood Count


 8.6 x10^3/uL


(4.0-11.0) 


 


 





 


Red Blood Count


 3.83 x10^6/uL


(3.50-5.40) 


 


 





 


Hemoglobin


 12.7 g/dL


(12.0-15.5) 


 


 





 


Hematocrit


 36.8 %


(36.0-47.0) 


 


 





 


Mean Corpuscular Volume 96 fL ()    


 


Mean Corpuscular Hemoglobin 33 pg (25-35)    


 


Mean Corpuscular Hemoglobin


Concent 35 g/dL


(31-37) 


 


 





 


Red Cell Distribution Width


 15.2 %


(11.5-14.5) 


 


 





 


Platelet Count


 279 x10^3/uL


(140-400) 


 


 





 


Neutrophils (%) (Auto) 72 % (31-73)    


 


Lymphocytes (%) (Auto) 16 % (24-48)    


 


Monocytes (%) (Auto) 11 % (0-9)    


 


Eosinophils (%) (Auto) 0 % (0-3)    


 


Basophils (%) (Auto) 1 % (0-3)    


 


Neutrophils # (Auto)


 6.2 x10^3/uL


(1.8-7.7) 


 


 





 


Lymphocytes # (Auto)


 1.4 x10^3/uL


(1.0-4.8) 


 


 





 


Monocytes # (Auto)


 1.0 x10^3/uL


(0.0-1.1) 


 


 





 


Eosinophils # (Auto)


 0.0 x10^3/uL


(0.0-0.7) 


 


 





 


Basophils # (Auto)


 0.0 x10^3/uL


(0.0-0.2) 


 


 





 


Prothrombin Time


 26.3 SEC


(11.7-14.0) 


 


 





 


Prothromb Time International


Ratio 2.4 (0.8-1.1) 


 


 


 





 


Sodium Level


 139 mmol/L


(136-145) 


 


 





 


Potassium Level


 3.4 mmol/L


(3.5-5.1) 


 


 





 


Chloride Level


 100 mmol/L


() 


 


 





 


Carbon Dioxide Level


 28 mmol/L


(21-32) 


 


 





 


Anion Gap 11 (6-14)    


 


Blood Urea Nitrogen


 32 mg/dL


(7-20) 


 


 





 


Creatinine


 1.2 mg/dL


(0.6-1.0) 


 


 





 


Estimated GFR


(Cockcroft-Gault) 42.6 


 


 


 





 


BUN/Creatinine Ratio 27 (6-20)    


 


Glucose Level


 125 mg/dL


(70-99) 


 


 





 


Calcium Level


 8.3 mg/dL


(8.5-10.1) 


 


 





 


Total Bilirubin


 0.6 mg/dL


(0.2-1.0) 


 


 





 


Aspartate Amino Transf


(AST/SGOT) 13 U/L (15-37) 


 


 


 





 


Alanine Aminotransferase


(ALT/SGPT) 17 U/L (14-59) 


 


 


 





 


Alkaline Phosphatase


 108 U/L


() 


 


 





 


Total Protein


 7.9 g/dL


(6.4-8.2) 


 


 





 


Albumin


 3.4 g/dL


(3.4-5.0) 


 


 





 


Albumin/Globulin Ratio 0.8 (1.0-1.7)    


 


Glucose (Fingerstick)


 


 132 mg/dL


(70-99) 


 93 mg/dL


(70-99)


 


Triglycerides Level


 


 


 56 mg/dL


(0-150) 





 


Cholesterol Level


 


 


 139 mg/dL


(0-200) 





 


LDL Cholesterol, Calculated


 


 


 66 mg/dL


(0-100) 





 


VLDL Cholesterol, Calculated


 


 


 11 mg/dL


(0-40) 





 


Non-HDL Cholesterol Calculated


 


 


 77 mg/dL


(0-129) 





 


HDL Cholesterol


 


 


 62 mg/dL


(40-60) 





 


Cholesterol/HDL Ratio   2.2  


 


Test


 4/1/21


16:41 4/1/21


21:11 4/2/21


03:54 4/2/21


07:09


 


Glucose (Fingerstick)


 115 mg/dL


(70-99) 153 mg/dL


(70-99) 


 114 mg/dL


(70-99)


 


Prothrombin Time


 


 


 23.3 SEC


(11.7-14.0) 





 


Prothromb Time International


Ratio 


 


 2.1 (0.8-1.1) 


 





 


Sodium Level


 


 


 140 mmol/L


(136-145) 





 


Potassium Level


 


 


 4.2 mmol/L


(3.5-5.1) 





 


Chloride Level


 


 


 104 mmol/L


() 





 


Carbon Dioxide Level


 


 


 29 mmol/L


(21-32) 





 


Anion Gap   7 (6-14)  


 


Blood Urea Nitrogen


 


 


 26 mg/dL


(7-20) 





 


Creatinine


 


 


 1.1 mg/dL


(0.6-1.0) 





 


Estimated GFR


(Cockcroft-Gault) 


 


 47.1 


 





 


Glucose Level


 


 


 72 mg/dL


(70-99) 





 


Calcium Level


 


 


 8.9 mg/dL


(8.5-10.1) 





 


Magnesium Level


 


 


 1.9 mg/dL


(1.8-2.4) 











Laboratory Tests








Test


 4/1/21


16:41 4/1/21


21:11 4/2/21


03:54 4/2/21


07:09


 


Glucose (Fingerstick)


 115 mg/dL


(70-99) 153 mg/dL


(70-99) 


 114 mg/dL


(70-99)


 


Prothrombin Time


 


 


 23.3 SEC


(11.7-14.0) 





 


Prothromb Time International


Ratio 


 


 2.1 (0.8-1.1) 


 





 


Sodium Level


 


 


 140 mmol/L


(136-145) 





 


Potassium Level


 


 


 4.2 mmol/L


(3.5-5.1) 





 


Chloride Level


 


 


 104 mmol/L


() 





 


Carbon Dioxide Level


 


 


 29 mmol/L


(21-32) 





 


Anion Gap   7 (6-14)  


 


Blood Urea Nitrogen


 


 


 26 mg/dL


(7-20) 





 


Creatinine


 


 


 1.1 mg/dL


(0.6-1.0) 





 


Estimated GFR


(Cockcroft-Gault) 


 


 47.1 


 





 


Glucose Level


 


 


 72 mg/dL


(70-99) 





 


Calcium Level


 


 


 8.9 mg/dL


(8.5-10.1) 





 


Magnesium Level


 


 


 1.9 mg/dL


(1.8-2.4) 











Medications





Current Medications


Ondansetron HCl (Zofran) 4 mg PRN Q8HRS  PRN IV NAUSEA/VOMITING;  Start 3/31/21 

at 16:30;  Stop 4/1/21 at 16:29;  Status DC


Morphine Sulfate (Morphine Sulfate) 2 mg PRN Q2HR  PRN IV PAIN;  Start 3/31/21 

at 16:30;  Stop 3/31/21 at 17:45;  Status DC


Morphine Sulfate (Morphine Sulfate) 1 mg PRN Q4HRS  PRN IV SEVERE PAIN 7-10;  

Start 3/31/21 at 17:15


Amlodipine Besylate (Norvasc) 10 mg DAILY PO  Last administered on 4/1/21at 

08:53;  Start 4/1/21 at 09:00


Carvedilol (Coreg) 6.25 mg BIDWMEALS PO  Last administered on 4/1/21at 17:17;  

Start 3/31/21 at 21:00


Cyclobenzaprine HCl (Flexeril) 5 mg PRN TID  PRN PO MUSCLE SPASMS Last 

administered on 3/31/21at 21:50;  Start 3/31/21 at 17:15


Furosemide (Lasix) 40 mg DAILY PO  Last administered on 4/1/21at 08:52;  Start 

4/1/21 at 09:00


Glipizide (Glucotrol) 2.5 mg DAILYWBKFT PO  Last administered on 4/1/21at 08:00;

 Start 4/1/21 at 08:00


Acetaminophen/ Hydrocodone Bitart (Lortab 5/325) 1 tab QHS PO  Last administered

on 4/1/21at 21:58;  Start 3/31/21 at 21:00


Acetaminophen/ Hydrocodone Bitart (Lortab 5/325) 1 tab PRN Q4HRS  PRN PO SEVERE 

PAIN 7-10;  Start 3/31/21 at 17:15


Losartan Potassium (Cozaar) 50 mg DAILY PO  Last administered on 4/1/21at 08:53;

 Start 4/1/21 at 09:00


Atorvastatin Calcium (Lipitor) 10 mg QHS PO  Last administered on 4/1/21at 

21:57;  Start 3/31/21 at 21:00


Metformin HCl (Glucophage) 500 mg BIDWMEALS PO  Last administered on 4/1/21at 

17:16;  Start 4/1/21 at 08:00


Polyethylene Glycol (miraLAX PACKET) 17 gm DAILY PO  Last administered on 

4/1/21at 08:52;  Start 4/1/21 at 09:00


Potassium Chloride (Klor-Con) 20 meq DAILY PO  Last administered on 4/1/21at 

08:54;  Start 4/1/21 at 09:00;  Stop 4/1/21 at 10:21;  Status DC


Warfarin Sodium (Coumadin) 1.5 mg DAILY16 PO  Last administered on 3/31/21at 

19:30;  Start 3/31/21 at 19:30;  Stop 4/2/21 at 04:49;  Status DC


Acetaminophen (Tylenol) 650 mg PRN Q6HRS  PRN PO MILD PAIN / TEMP > 100.3'F;  

Start 3/31/21 at 17:15


Magnesium Hydroxide (Milk Of Magnesia) 2,400 mg PRN DAILY  PRN PO CONSTIPATION; 

Start 3/31/21 at 17:15


Warfarin Sodium (Coumadin Per Physician) 1 each PRN DAILY  PRN MC SEE COMMENTS 

Last administered on 4/1/21at 15:54;  Start 3/31/21 at 18:30


Potassium Chloride (Klor-Con) 20 meq BID PO  Last administered on 4/1/21at 

21:57;  Start 4/1/21 at 21:00


Ceftriaxone Sodium (Rocephin) 1 gm Q24H IVP  Last administered on 4/1/21at 

15:12;  Start 4/1/21 at 13:00


Warfarin Sodium (Coumadin) 1 mg DAILY16 PO ;  Start 4/2/21 at 16:00


Warfarin Sodium (Coumadin) 0.5 mg DAILY16 PO ;  Start 4/2/21 at 04:49





Active Scripts


Active


Hydrocodone-Apap 5-325  ** (Hydrocodone Bit/Acetaminophen) 1 Tab Tablet 1 Tab PO

PRN Q4HRS PRN


Coumadin (Warfarin Sodium) 2.5 Mg Tablet 2 Mg PO DAILY16


Furosemide 40 Mg Tablet 40 Mg PO DAILY


Reported


Tramadol Hcl 50 Mg Tablet 1 Tab PO PRN Q6HRS PRN


Carvedilol 25 Mg Tablet 6.25 Mg PO BIDWMEALS


Aspirin Ec (Aspirin) 81 Mg Tablet.dr 81 Mg PO 


Potassium Chloride ** (Potassium Chloride) 20 Meq Tablet.er 20 Meq PO DAILY


Lovastatin 40 Mg Tablet 40 Mg PO HS


Metformin Hcl 500 Mg Tablet 500 Mg PO BIDWMEALS


Tylenol (Acetaminophen) 325 Mg Tablet 1-2 Tab PO PRN Q4HRS


Losartan Potassium 100 Mg Tablet 50 Mg PO DAILY


Glipizide 5 Mg Tablet 5 Mg PO DAILY


Amlodipine Besylate 10 Mg Tablet 10 Mg PO DAILY


Vitals/I & O





Vital Sign - Last 24 Hours








 4/1/21 4/1/21 4/1/21 4/1/21





 15:00 17:17 19:00 20:00


 


Temp 97.8  97.4 





 97.8  97.4 


 


Pulse 102 102 83 


 


Resp 22  18 


 


B/P (MAP) 152/61 (91) 152/61 128/59 (82) 


 


Pulse Ox 96  96 


 


O2 Delivery Room Air  Room Air Room Air


 


    





    





 4/1/21 4/1/21 4/1/21 4/2/21





 21:58 22:58 23:00 03:00


 


Temp   97.6 98.0





   97.6 98.0


 


Pulse   81 82


 


Resp 20 18 17 18


 


B/P (MAP)   132/58 (82) 123/56 (78)


 


Pulse Ox 96 95 96 95


 


O2 Delivery Room Air Room Air Room Air Room Air


 


    





    





 4/2/21   





 07:00   


 


Temp 96.7   





 96.7   


 


Pulse 103   


 


Resp 22   


 


B/P (MAP) 135/65 (88)   


 


Pulse Ox 96   


 


O2 Delivery Room Air   











Justifications for Admission


Other Justification














ADRIANA TENORIO MD            Apr 2, 2021 10:07

## 2021-04-02 NOTE — PDOC
PROGRESS NOTES


Date of Service


DATE: 4/2/21 


TIME: 08:58





Subjective


Subjective


No new complaints.





Objective


Objective





Vital Signs








  Date Time  Temp Pulse Resp B/P (MAP) Pulse Ox O2 Delivery O2 Flow Rate FiO2


 


4/2/21 07:00 96.7 103 22 135/65 (88) 96 Room Air  





 96.7       











Physical Exam


Physical Exam


She is alert,supine in bed and she required minimal assistance in coming to a 

sitting position and she felt some discomfort while coming to a sitting position

but then she got up and walked with wide based gait using roller walker on level

surface.No change with her neurological status. No tenderness to palpation over 

lumbar spine area it self but some tenderness to palpation over right sacroiliac

joint area.





Assessment


Assessment


Problems


Medical Problems:


(1) Back pain


Status: Acute  











Plan


Plan of Care


Before getting her up I spoke to her and her daughter about kyphoplasty as bone 

scan revealed increased uptake at L3 level and they agree for kyphoplasty as 

patient's  ahd the procedure done in the past with good results and I 

have consulted IR for it,but the way she is moving,I am not sure,she really 

needs kyphoplasty. To let Xander Kolb Holaday and IR decide on it.





Comment


Review of Relevant


I have reviewed the following items arianna (where applicable) has been applied.


Labs





Laboratory Tests








Test


 3/31/21


17:25 3/31/21


19:41 4/1/21


03:10 4/1/21


07:18


 


White Blood Count


 8.6 x10^3/uL


(4.0-11.0) 


 


 





 


Red Blood Count


 3.83 x10^6/uL


(3.50-5.40) 


 


 





 


Hemoglobin


 12.7 g/dL


(12.0-15.5) 


 


 





 


Hematocrit


 36.8 %


(36.0-47.0) 


 


 





 


Mean Corpuscular Volume 96 fL ()    


 


Mean Corpuscular Hemoglobin 33 pg (25-35)    


 


Mean Corpuscular Hemoglobin


Concent 35 g/dL


(31-37) 


 


 





 


Red Cell Distribution Width


 15.2 %


(11.5-14.5) 


 


 





 


Platelet Count


 279 x10^3/uL


(140-400) 


 


 





 


Neutrophils (%) (Auto) 72 % (31-73)    


 


Lymphocytes (%) (Auto) 16 % (24-48)    


 


Monocytes (%) (Auto) 11 % (0-9)    


 


Eosinophils (%) (Auto) 0 % (0-3)    


 


Basophils (%) (Auto) 1 % (0-3)    


 


Neutrophils # (Auto)


 6.2 x10^3/uL


(1.8-7.7) 


 


 





 


Lymphocytes # (Auto)


 1.4 x10^3/uL


(1.0-4.8) 


 


 





 


Monocytes # (Auto)


 1.0 x10^3/uL


(0.0-1.1) 


 


 





 


Eosinophils # (Auto)


 0.0 x10^3/uL


(0.0-0.7) 


 


 





 


Basophils # (Auto)


 0.0 x10^3/uL


(0.0-0.2) 


 


 





 


Prothrombin Time


 26.3 SEC


(11.7-14.0) 


 


 





 


Prothromb Time International


Ratio 2.4 (0.8-1.1) 


 


 


 





 


Sodium Level


 139 mmol/L


(136-145) 


 


 





 


Potassium Level


 3.4 mmol/L


(3.5-5.1) 


 


 





 


Chloride Level


 100 mmol/L


() 


 


 





 


Carbon Dioxide Level


 28 mmol/L


(21-32) 


 


 





 


Anion Gap 11 (6-14)    


 


Blood Urea Nitrogen


 32 mg/dL


(7-20) 


 


 





 


Creatinine


 1.2 mg/dL


(0.6-1.0) 


 


 





 


Estimated GFR


(Cockcroft-Gault) 42.6 


 


 


 





 


BUN/Creatinine Ratio 27 (6-20)    


 


Glucose Level


 125 mg/dL


(70-99) 


 


 





 


Calcium Level


 8.3 mg/dL


(8.5-10.1) 


 


 





 


Total Bilirubin


 0.6 mg/dL


(0.2-1.0) 


 


 





 


Aspartate Amino Transf


(AST/SGOT) 13 U/L (15-37) 


 


 


 





 


Alanine Aminotransferase


(ALT/SGPT) 17 U/L (14-59) 


 


 


 





 


Alkaline Phosphatase


 108 U/L


() 


 


 





 


Total Protein


 7.9 g/dL


(6.4-8.2) 


 


 





 


Albumin


 3.4 g/dL


(3.4-5.0) 


 


 





 


Albumin/Globulin Ratio 0.8 (1.0-1.7)    


 


Glucose (Fingerstick)


 


 132 mg/dL


(70-99) 


 93 mg/dL


(70-99)


 


Triglycerides Level


 


 


 56 mg/dL


(0-150) 





 


Cholesterol Level


 


 


 139 mg/dL


(0-200) 





 


LDL Cholesterol, Calculated


 


 


 66 mg/dL


(0-100) 





 


VLDL Cholesterol, Calculated


 


 


 11 mg/dL


(0-40) 





 


Non-HDL Cholesterol Calculated


 


 


 77 mg/dL


(0-129) 





 


HDL Cholesterol


 


 


 62 mg/dL


(40-60) 





 


Cholesterol/HDL Ratio   2.2  


 


Test


 4/1/21


16:41 4/1/21


21:11 4/2/21


03:54 4/2/21


07:09


 


Glucose (Fingerstick)


 115 mg/dL


(70-99) 153 mg/dL


(70-99) 


 114 mg/dL


(70-99)


 


Prothrombin Time


 


 


 23.3 SEC


(11.7-14.0) 





 


Prothromb Time International


Ratio 


 


 2.1 (0.8-1.1) 


 





 


Sodium Level


 


 


 140 mmol/L


(136-145) 





 


Potassium Level


 


 


 4.2 mmol/L


(3.5-5.1) 





 


Chloride Level


 


 


 104 mmol/L


() 





 


Carbon Dioxide Level


 


 


 29 mmol/L


(21-32) 





 


Anion Gap   7 (6-14)  


 


Blood Urea Nitrogen


 


 


 26 mg/dL


(7-20) 





 


Creatinine


 


 


 1.1 mg/dL


(0.6-1.0) 





 


Estimated GFR


(Cockcroft-Gault) 


 


 47.1 


 





 


Glucose Level


 


 


 72 mg/dL


(70-99) 





 


Calcium Level


 


 


 8.9 mg/dL


(8.5-10.1) 





 


Magnesium Level


 


 


 1.9 mg/dL


(1.8-2.4) 











Laboratory Tests








Test


 4/1/21


16:41 4/1/21


21:11 4/2/21


03:54 4/2/21


07:09


 


Glucose (Fingerstick)


 115 mg/dL


(70-99) 153 mg/dL


(70-99) 


 114 mg/dL


(70-99)


 


Prothrombin Time


 


 


 23.3 SEC


(11.7-14.0) 





 


Prothromb Time International


Ratio 


 


 2.1 (0.8-1.1) 


 





 


Sodium Level


 


 


 140 mmol/L


(136-145) 





 


Potassium Level


 


 


 4.2 mmol/L


(3.5-5.1) 





 


Chloride Level


 


 


 104 mmol/L


() 





 


Carbon Dioxide Level


 


 


 29 mmol/L


(21-32) 





 


Anion Gap   7 (6-14)  


 


Blood Urea Nitrogen


 


 


 26 mg/dL


(7-20) 





 


Creatinine


 


 


 1.1 mg/dL


(0.6-1.0) 





 


Estimated GFR


(Cockcroft-Gault) 


 


 47.1 


 





 


Glucose Level


 


 


 72 mg/dL


(70-99) 





 


Calcium Level


 


 


 8.9 mg/dL


(8.5-10.1) 





 


Magnesium Level


 


 


 1.9 mg/dL


(1.8-2.4) 











Medications





Current Medications


Ondansetron HCl (Zofran) 4 mg PRN Q8HRS  PRN IV NAUSEA/VOMITING;  Start 3/31/21 

at 16:30;  Stop 4/1/21 at 16:29;  Status DC


Morphine Sulfate (Morphine Sulfate) 2 mg PRN Q2HR  PRN IV PAIN;  Start 3/31/21 

at 16:30;  Stop 3/31/21 at 17:45;  Status DC


Morphine Sulfate (Morphine Sulfate) 1 mg PRN Q4HRS  PRN IV SEVERE PAIN 7-10;  

Start 3/31/21 at 17:15


Amlodipine Besylate (Norvasc) 10 mg DAILY PO  Last administered on 4/1/21at 

08:53;  Start 4/1/21 at 09:00


Carvedilol (Coreg) 6.25 mg BIDWMEALS PO  Last administered on 4/1/21at 17:17;  

Start 3/31/21 at 21:00


Cyclobenzaprine HCl (Flexeril) 5 mg PRN TID  PRN PO MUSCLE SPASMS Last 

administered on 3/31/21at 21:50;  Start 3/31/21 at 17:15


Furosemide (Lasix) 40 mg DAILY PO  Last administered on 4/1/21at 08:52;  Start 

4/1/21 at 09:00


Glipizide (Glucotrol) 2.5 mg DAILYWBKFT PO  Last administered on 4/1/21at 08:00;

 Start 4/1/21 at 08:00


Acetaminophen/ Hydrocodone Bitart (Lortab 5/325) 1 tab QHS PO  Last administered

on 4/1/21at 21:58;  Start 3/31/21 at 21:00


Acetaminophen/ Hydrocodone Bitart (Lortab 5/325) 1 tab PRN Q4HRS  PRN PO SEVERE 

PAIN 7-10;  Start 3/31/21 at 17:15


Losartan Potassium (Cozaar) 50 mg DAILY PO  Last administered on 4/1/21at 08:53;

 Start 4/1/21 at 09:00


Atorvastatin Calcium (Lipitor) 10 mg QHS PO  Last administered on 4/1/21at 

21:57;  Start 3/31/21 at 21:00


Metformin HCl (Glucophage) 500 mg BIDWMEALS PO  Last administered on 4/1/21at 

17:16;  Start 4/1/21 at 08:00


Polyethylene Glycol (miraLAX PACKET) 17 gm DAILY PO  Last administered on 

4/1/21at 08:52;  Start 4/1/21 at 09:00


Potassium Chloride (Klor-Con) 20 meq DAILY PO  Last administered on 4/1/21at 

08:54;  Start 4/1/21 at 09:00;  Stop 4/1/21 at 10:21;  Status DC


Warfarin Sodium (Coumadin) 1.5 mg DAILY16 PO  Last administered on 3/31/21at 

19:30;  Start 3/31/21 at 19:30;  Stop 4/2/21 at 04:49;  Status DC


Acetaminophen (Tylenol) 650 mg PRN Q6HRS  PRN PO MILD PAIN / TEMP > 100.3'F;  

Start 3/31/21 at 17:15


Magnesium Hydroxide (Milk Of Magnesia) 2,400 mg PRN DAILY  PRN PO CONSTIPATION; 

Start 3/31/21 at 17:15


Warfarin Sodium (Coumadin Per Physician) 1 each PRN DAILY  PRN MC SEE COMMENTS 

Last administered on 4/1/21at 15:54;  Start 3/31/21 at 18:30


Potassium Chloride (Klor-Con) 20 meq BID PO  Last administered on 4/1/21at 

21:57;  Start 4/1/21 at 21:00


Ceftriaxone Sodium (Rocephin) 1 gm Q24H IVP  Last administered on 4/1/21at 1

5:12;  Start 4/1/21 at 13:00


Warfarin Sodium (Coumadin) 1 mg DAILY16 PO ;  Start 4/2/21 at 16:00


Warfarin Sodium (Coumadin) 0.5 mg DAILY16 PO ;  Start 4/2/21 at 04:49





Active Scripts


Active


Hydrocodone-Apap 5-325  ** (Hydrocodone Bit/Acetaminophen) 1 Tab Tablet 1 Tab PO

PRN Q4HRS PRN


Coumadin (Warfarin Sodium) 2.5 Mg Tablet 2 Mg PO DAILY16


Furosemide 40 Mg Tablet 40 Mg PO DAILY


Reported


Tramadol Hcl 50 Mg Tablet 1 Tab PO PRN Q6HRS PRN


Carvedilol 25 Mg Tablet 6.25 Mg PO BIDWMEALS


Aspirin Ec (Aspirin) 81 Mg Tablet.dr 81 Mg PO 


Potassium Chloride ** (Potassium Chloride) 20 Meq Tablet.er 20 Meq PO DAILY


Lovastatin 40 Mg Tablet 40 Mg PO HS


Metformin Hcl 500 Mg Tablet 500 Mg PO BIDWMEALS


Tylenol (Acetaminophen) 325 Mg Tablet 1-2 Tab PO PRN Q4HRS


Losartan Potassium 100 Mg Tablet 50 Mg PO DAILY


Glipizide 5 Mg Tablet 5 Mg PO DAILY


Amlodipine Besylate 10 Mg Tablet 10 Mg PO DAILY


Vitals/I & O





Vital Sign - Last 24 Hours








 4/1/21 4/1/21 4/1/21 4/1/21





 15:00 17:17 19:00 20:00


 


Temp 97.8  97.4 





 97.8  97.4 


 


Pulse 102 102 83 


 


Resp 22  18 


 


B/P (MAP) 152/61 (91) 152/61 128/59 (82) 


 


Pulse Ox 96  96 


 


O2 Delivery Room Air  Room Air Room Air


 


    





    





 4/1/21 4/1/21 4/1/21 4/2/21





 21:58 22:58 23:00 03:00


 


Temp   97.6 98.0





   97.6 98.0


 


Pulse   81 82


 


Resp 20 18 17 18


 


B/P (MAP)   132/58 (82) 123/56 (78)


 


Pulse Ox 96 95 96 95


 


O2 Delivery Room Air Room Air Room Air Room Air


 


    





    





 4/2/21   





 07:00   


 


Temp 96.7   





 96.7   


 


Pulse 103   


 


Resp 22   


 


B/P (MAP) 135/65 (88)   


 


Pulse Ox 96   


 


O2 Delivery Room Air   











Justifications for Admission


Other Justification














RYAN TRIPP MD             Apr 2, 2021 09:16

## 2021-04-02 NOTE — RAD
MRI of the lumbar spine without contrast 4/2/2021



CLINICAL HISTORY: Low back pain. Fall. L3 compression fracture.



TECHNIQUE: Unenhanced T1-weighted and T2-weighted sagittal and axial and inversion recovery sagittal 
images of the lumbar spine were obtained.



FINDINGS: Comparison is made to the patient's CT scan of the lumbar spine dated 3/31/2021.



Minimal S-shaped curvature of the thoracolumbar spine is seen. Degenerative signal changes are seen i
nvolving all of the disks of the lumbar spine. Degenerative signal changes are seen within the marrow
 surrounding these discs. The conus medullaris is normal morphology, position, and signal characteris
tics.



An acute compression fracture is seen involving the L3 vertebral body. This vertebral body has lost a
pproximately 40 percent of its normal height. No significant retropulsion of bone fragments into the 
central spinal canal is seen. No additional acute compression fracture of the lumbar vertebrae is not
ed.



The changes of degenerative disc disease are seen throughout the lumbar spine. These consist of mild 
to moderate generalized disc bulges and degenerative changes involving the facet joints along with mi
ld ligamentum flavum hypertrophy. These findings result in mild to moderate central spinal canal sten
osis with mild bilateral neural foraminal stenosis at L3-4, moderate central spinal canal stenosis wi
th mild to moderate right neural foraminal stenosis at L4-5 and mild right neural foraminal stenosis 
at L5-S1.



IMPRESSION: An acute compression fracture is again seen involving the L3 vertebral body as discussed 
above. No significant retropulsion of bone fragments into the central spinal canal is seen.



Electronically signed by: Nolberto Mcneil MD (4/2/2021 4:02 PM) [id number]

## 2021-04-02 NOTE — PDOC
CARDIOLOGY PROGRESS NOTE


SUBJECTIVE:


No new events overnight.  She is planned for a kyphoplasty early next week.





OBJECTIVE:


Vital Signs/I&O:





                                   Vital Signs








  Date Time  Temp Pulse Resp B/P (MAP) Pulse Ox O2 Delivery O2 Flow Rate FiO2


 


4/2/21 17:15  103  179/74    


 


4/2/21 15:00 97.5  20  97 Room Air  





 97.5       








Objective:


No significant changes to exam


Irregularly irregular rhythm


Clear lung fields


No significant lower extremity edema


Soft abdomen


No focal neurologic deficits





CURRENT MEDICATIONS:


Lasix, amlodipine, carvedilol and warfarin.





DIAGNOSTIC TESTING:


No new cardiovascular testing


Labs:


                                Laboratory Tests


4/2/21 03:54











Laboratory Tests








Test


 4/1/21


21:11 4/2/21


03:54 4/2/21


07:09 4/2/21


10:59


 


Glucose (Fingerstick)


 153 mg/dL


(70-99)  H 


 114 mg/dL


(70-99)  H 144 mg/dL


(70-99)  H


 


Prothrombin Time


 


 23.3 SEC


(11.7-14.0)  H 


 





 


Prothromb Time International


Ratio 


 2.1 (0.8-1.1)


H 


 





 


Sodium Level


 


 140 mmol/L


(136-145) 


 





 


Potassium Level


 


 4.2 mmol/L


(3.5-5.1) 


 





 


Chloride Level


 


 104 mmol/L


() 


 





 


Carbon Dioxide Level


 


 29 mmol/L


(21-32) 


 





 


Anion Gap  7 (6-14)    


 


Blood Urea Nitrogen


 


 26 mg/dL


(7-20)  H 


 





 


Creatinine


 


 1.1 mg/dL


(0.6-1.0)  H 


 





 


Estimated GFR


(Cockcroft-Gault) 


 47.1  


 


 





 


Glucose Level


 


 72 mg/dL


(70-99) 


 





 


Calcium Level


 


 8.9 mg/dL


(8.5-10.1) 


 





 


Test


 4/2/21


16:10 4/2/21


16:34 


 





 


SARS-CoV-2 Antigen (Rapid)


 Negative


(NEGATIVE) 


 


 





 


Glucose (Fingerstick)


 


 138 mg/dL


(70-99)  H 


 














ASSESSMENT:


1.  Acute low back pain with plans for kyphoplasty


2.  Chronic atrial fibrillation on Coumadin therapy


3.  Hypertension





PLAN:


1.  She stable from a cardiovascular standpoint.  Continue current medical 

therapy.  Defer to primary care physician regarding discontinuation of Coumadin 

and IR prior to her kyphoplasty.  Supportive care for now.





Justicifation of Admission Dx:


Justifications for Admission:


Justification of Admission Dx:  N/A











HAIDER GLOVER MD        Apr 2, 2021 18:35

## 2021-04-03 VITALS — DIASTOLIC BLOOD PRESSURE: 80 MMHG | SYSTOLIC BLOOD PRESSURE: 130 MMHG

## 2021-04-03 VITALS — DIASTOLIC BLOOD PRESSURE: 58 MMHG | SYSTOLIC BLOOD PRESSURE: 127 MMHG

## 2021-04-03 VITALS — DIASTOLIC BLOOD PRESSURE: 80 MMHG | SYSTOLIC BLOOD PRESSURE: 100 MMHG

## 2021-04-03 VITALS — DIASTOLIC BLOOD PRESSURE: 60 MMHG | SYSTOLIC BLOOD PRESSURE: 158 MMHG

## 2021-04-03 VITALS — SYSTOLIC BLOOD PRESSURE: 105 MMHG | DIASTOLIC BLOOD PRESSURE: 55 MMHG

## 2021-04-03 VITALS — SYSTOLIC BLOOD PRESSURE: 135 MMHG | DIASTOLIC BLOOD PRESSURE: 73 MMHG

## 2021-04-03 LAB — PROTHROMBIN TIME: 20.9 SEC (ref 11.7–14)

## 2021-04-03 RX ADMIN — CARVEDILOL SCH MG: 6.25 TABLET, FILM COATED ORAL at 08:21

## 2021-04-03 RX ADMIN — Medication PRN EACH: at 11:24

## 2021-04-03 RX ADMIN — POLYETHYLENE GLYCOL 3350 SCH GM: 17 POWDER, FOR SOLUTION ORAL at 08:23

## 2021-04-03 RX ADMIN — FUROSEMIDE SCH MG: 40 TABLET ORAL at 08:20

## 2021-04-03 RX ADMIN — HYDROCODONE BITARTRATE AND ACETAMINOPHEN SCH TAB: 5; 325 TABLET ORAL at 22:16

## 2021-04-03 RX ADMIN — LOSARTAN POTASSIUM SCH MG: 50 TABLET ORAL at 08:22

## 2021-04-03 RX ADMIN — POTASSIUM CHLORIDE SCH MEQ: 1500 TABLET, EXTENDED RELEASE ORAL at 22:16

## 2021-04-03 RX ADMIN — CARVEDILOL SCH MG: 6.25 TABLET, FILM COATED ORAL at 17:31

## 2021-04-03 RX ADMIN — CEFTRIAXONE SCH GM: 1 INJECTION, POWDER, FOR SOLUTION INTRAMUSCULAR; INTRAVENOUS at 12:46

## 2021-04-03 RX ADMIN — VITAMIN D, TAB 1000IU (100/BT) SCH UNIT: 25 TAB at 08:23

## 2021-04-03 RX ADMIN — ATORVASTATIN CALCIUM SCH MG: 10 TABLET, FILM COATED ORAL at 22:16

## 2021-04-03 RX ADMIN — Medication SCH CAP: at 22:16

## 2021-04-03 RX ADMIN — Medication SCH CAP: at 08:22

## 2021-04-03 RX ADMIN — POTASSIUM CHLORIDE SCH MEQ: 1500 TABLET, EXTENDED RELEASE ORAL at 08:23

## 2021-04-03 NOTE — PDOC
PROGRESS NOTES


Date of Service


DATE: 4/3/21 


TIME: 10:55





Subjective


Subjective


MRI lumbar spine confirms acute L# lumbar vertebral fracture with loss of 40%  

of height. has spinal stenosis. inr 1.8. blood sugars are okay. still with low 

back pain with turning in bed and getting out of bed. [facility] after dismissal . she needs to be in hospital still 

due to severe low back pain with change in position and transferring and walking





Objective


Objective





Vital Signs








  Date Time  Temp Pulse Resp B/P (MAP) Pulse Ox O2 Delivery O2 Flow Rate FiO2


 


4/3/21 10:38 97.5 76 18 135/73 (93) 96 Room Air  





 97.5       














Intake and Output 


 


 4/3/21





 07:00


 


Intake Total 590 ml


 


Output Total 0 ml


 


Balance 590 ml


 


 


 


Intake Oral 590 ml


 


Output Urine Total 0 ml


 


# Voids 5











Physical Exam


Abdomen:  Soft


Heart:  Regular rate, Normal S1, Normal S2


Extremities:  No edema


General:  Alert


HEENT:  Atraumatic


Lungs:  Clear to auscultation


Neck:  Supple


Neuro:  Normal speech


Psych/Mental Status:  Mental status NL


Skin:  No rashes





Assessment


Assessment


Problems no syncope per cardiology. daughter witnessed fall and is a mechanical 

fall


2.  Low back pain  even prior to the fall.


3.  Acute  L3 vertebral fracture,


4.  Lumbar spinal stenosis.


5.  Lumbar muscle spasms.


6.  Diabetes mellitus type 2.


7.  Hypertension.


8.  Hyperlipidemia.


9.  Chronic diastolic congestive heart failure.


10.  Persistent atrial fibrillation, on Coumadin.


      disuse myopathy


Medical Problems:


(1) Back pain


Status: Acute  








Plan


Plan of Care


kyphoplasty monday


analgesics prn including iv prn morphine


hold coumadin


PT and OT





Comment


Review of Relevant


I have reviewed the following items arianna (where applicable) has been applied.


Labs





Laboratory Tests








Test


 4/1/21


16:41 4/1/21


21:11 4/2/21


03:54 4/2/21


07:09


 


Glucose (Fingerstick)


 115 mg/dL


(70-99) 153 mg/dL


(70-99) 


 114 mg/dL


(70-99)


 


Prothrombin Time


 


 


 23.3 SEC


(11.7-14.0) 





 


Prothromb Time International


Ratio 


 


 2.1 (0.8-1.1) 


 





 


Sodium Level


 


 


 140 mmol/L


(136-145) 





 


Potassium Level


 


 


 4.2 mmol/L


(3.5-5.1) 





 


Chloride Level


 


 


 104 mmol/L


() 





 


Carbon Dioxide Level


 


 


 29 mmol/L


(21-32) 





 


Anion Gap   7 (6-14)  


 


Blood Urea Nitrogen


 


 


 26 mg/dL


(7-20) 





 


Creatinine


 


 


 1.1 mg/dL


(0.6-1.0) 





 


Estimated GFR


(Cockcroft-Gault) 


 


 47.1 


 





 


Glucose Level


 


 


 72 mg/dL


(70-99) 





 


Calcium Level


 


 


 8.9 mg/dL


(8.5-10.1) 





 


Magnesium Level


 


 


 1.9 mg/dL


(1.8-2.4) 





 


Test


 4/2/21


10:59 4/2/21


16:10 4/2/21


16:34 4/2/21


21:08


 


Glucose (Fingerstick)


 144 mg/dL


(70-99) 


 138 mg/dL


(70-99) 146 mg/dL


(70-99)


 


SARS-CoV-2 Antigen (Rapid)


 


 Negative


(NEGATIVE) 


 





 


Test


 4/3/21


04:30 4/3/21


07:02 


 





 


Prothrombin Time


 20.9 SEC


(11.7-14.0) 


 


 





 


Prothromb Time International


Ratio 1.8 (0.8-1.1) 


 


 


 





 


Glucose (Fingerstick)


 


 118 mg/dL


(70-99) 


 











Laboratory Tests








Test


 4/2/21


10:59 4/2/21


16:10 4/2/21


16:34 4/2/21


21:08


 


Glucose (Fingerstick)


 144 mg/dL


(70-99) 


 138 mg/dL


(70-99) 146 mg/dL


(70-99)


 


SARS-CoV-2 Antigen (Rapid)


 


 Negative


(NEGATIVE) 


 





 


Test


 4/3/21


04:30 4/3/21


07:02 


 





 


Prothrombin Time


 20.9 SEC


(11.7-14.0) 


 


 





 


Prothromb Time International


Ratio 1.8 (0.8-1.1) 


 


 


 





 


Glucose (Fingerstick)


 


 118 mg/dL


(70-99) 


 











Medications





Current Medications


Ondansetron HCl (Zofran) 4 mg PRN Q8HRS  PRN IV NAUSEA/VOMITING;  Start 3/31/21 

at 16:30;  Stop 4/1/21 at 16:29;  Status DC


Morphine Sulfate (Morphine Sulfate) 2 mg PRN Q2HR  PRN IV PAIN;  Start 3/31/21 

at 16:30;  Stop 3/31/21 at 17:45;  Status DC


Morphine Sulfate (Morphine Sulfate) 1 mg PRN Q4HRS  PRN IV SEVERE PAIN 7-10;  

Start 3/31/21 at 17:15


Amlodipine Besylate (Norvasc) 10 mg DAILY PO  Last administered on 4/3/21at 

08:22;  Start 4/1/21 at 09:00


Carvedilol (Coreg) 6.25 mg BIDWMEALS PO  Last administered on 4/3/21at 08:21;  

Start 3/31/21 at 21:00


Cyclobenzaprine HCl (Flexeril) 5 mg PRN TID  PRN PO MUSCLE SPASMS Last 

administered on 3/31/21at 21:50;  Start 3/31/21 at 17:15


Furosemide (Lasix) 40 mg DAILY PO  Last administered on 4/3/21at 08:20;  Start 

4/1/21 at 09:00


Glipizide (Glucotrol) 2.5 mg DAILYWBKFT PO  Last administered on 4/3/21at 08:22;

 Start 4/1/21 at 08:00


Acetaminophen/ Hydrocodone Bitart (Lortab 5/325) 1 tab QHS PO  Last administered

on 4/2/21at 21:42;  Start 3/31/21 at 21:00


Acetaminophen/ Hydrocodone Bitart (Lortab 5/325) 1 tab PRN Q4HRS  PRN PO SEVERE 

PAIN 7-10;  Start 3/31/21 at 17:15


Losartan Potassium (Cozaar) 50 mg DAILY PO  Last administered on 4/3/21at 08:22;

 Start 4/1/21 at 09:00


Atorvastatin Calcium (Lipitor) 10 mg QHS PO  Last administered on 4/2/21at 

21:42;  Start 3/31/21 at 21:00


Metformin HCl (Glucophage) 500 mg BIDWMEALS PO  Last administered on 4/3/21at 

08:21;  Start 4/1/21 at 08:00


Polyethylene Glycol (miraLAX PACKET) 17 gm DAILY PO  Last administered on 

4/3/21at 08:23;  Start 4/1/21 at 09:00


Potassium Chloride (Klor-Con) 20 meq DAILY PO  Last administered on 4/1/21at 

08:54;  Start 4/1/21 at 09:00;  Stop 4/1/21 at 10:21;  Status DC


Warfarin Sodium (Coumadin) 1.5 mg DAILY16 PO  Last administered on 3/31/21at 

19:30;  Start 3/31/21 at 19:30;  Stop 4/2/21 at 04:49;  Status DC


Acetaminophen (Tylenol) 650 mg PRN Q6HRS  PRN PO MILD PAIN / TEMP > 100.3'F;  

Start 3/31/21 at 17:15


Magnesium Hydroxide (Milk Of Magnesia) 2,400 mg PRN DAILY  PRN PO CONSTIPATION; 

Start 3/31/21 at 17:15


Warfarin Sodium (Coumadin Per Physician) 1 each PRN DAILY  PRN MC SEE COMMENTS 

Last administered on 4/1/21at 15:54;  Start 3/31/21 at 18:30


Potassium Chloride (Klor-Con) 20 meq BID PO  Last administered on 4/3/21at 

08:23;  Start 4/1/21 at 21:00


Ceftriaxone Sodium (Rocephin) 1 gm Q24H IVP  Last administered on 4/2/21at 

14:04;  Start 4/1/21 at 13:00


Warfarin Sodium (Coumadin) 1 mg DAILY16 PO ;  Start 4/2/21 at 16:00;  Stop 

4/2/21 at 10:15;  Status DC


Warfarin Sodium (Coumadin) 0.5 mg DAILY16 PO ;  Start 4/2/21 at 04:49;  Stop 

4/2/21 at 10:15;  Status DC


Vitamin D (Vitamin D3) 2,000 unit DAILY PO  Last administered on 4/3/21at 08:23;

 Start 4/3/21 at 09:00


Lactobacillus Rhamnosus (Culturelle) 1 cap BID PO  Last administered on 4/3/21at

08:22;  Start 4/2/21 at 21:00





Active Scripts


Active


Hydrocodone-Apap 5-325  ** (Hydrocodone Bit/Acetaminophen) 1 Tab Tablet 1 Tab PO

PRN Q4HRS PRN


Coumadin (Warfarin Sodium) 2.5 Mg Tablet 2 Mg PO DAILY16


Furosemide 40 Mg Tablet 40 Mg PO DAILY


Reported


Tramadol Hcl 50 Mg Tablet 1 Tab PO PRN Q6HRS PRN


Carvedilol 25 Mg Tablet 6.25 Mg PO BIDWMEALS


Aspirin Ec (Aspirin) 81 Mg Tablet.dr 81 Mg PO 


Potassium Chloride ** (Potassium Chloride) 20 Meq Tablet.er 20 Meq PO DAILY


Lovastatin 40 Mg Tablet 40 Mg PO HS


Metformin Hcl 500 Mg Tablet 500 Mg PO BIDWMEALS


Tylenol (Acetaminophen) 325 Mg Tablet 1-2 Tab PO PRN Q4HRS


Losartan Potassium 100 Mg Tablet 50 Mg PO DAILY


Glipizide 5 Mg Tablet 5 Mg PO DAILY


Amlodipine Besylate 10 Mg Tablet 10 Mg PO DAILY


Vitals/I & O





Vital Sign - Last 24 Hours








 4/2/21 4/2/21 4/2/21 4/2/21





 11:00 14:05 14:05 15:00


 


Temp 97.4   97.5





 97.4   97.5


 


Pulse 97 97 97 103


 


Resp 20   20


 


B/P (MAP) 170/70 (103) 170/70 170/70 179/74 (109)


 


Pulse Ox 96   97


 


O2 Delivery Room Air   Room Air


 


    





    





 4/2/21 4/2/21 4/2/21 4/2/21





 17:15 19:00 19:30 21:42


 


Temp  97.9  





  97.9  


 


Pulse 103 90  


 


Resp  18  20


 


B/P (MAP) 179/74 144/63 (90)  


 


Pulse Ox  99  


 


O2 Delivery  Room Air Room Air Room Air


 


    





    





 4/2/21 4/2/21 4/3/21 4/3/21





 22:42 23:00 02:59 07:12


 


Temp  98.9 98.2 97.7





  98.9 98.2 97.7


 


Pulse  92 90 78


 


Resp 20 18 18 18


 


B/P (MAP)  127/55 (79) 130/80 (97) 127/58 (81)


 


Pulse Ox  96 100 90


 


O2 Delivery Room Air Room Air Room Air Room Air


 


    





    





 4/3/21 4/3/21 4/3/21 4/3/21





 08:21 08:22 08:22 10:38


 


Temp    97.5





    97.5


 


Pulse 78 78 78 76


 


Resp    18


 


B/P (MAP) 127/58 127/58 127/58 135/73 (93)


 


Pulse Ox    96


 


O2 Delivery    Room Air














Intake and Output   


 


 4/2/21 4/2/21 4/3/21





 15:00 23:00 07:00


 


Intake Total  340 ml 250 ml


 


Output Total   0 ml


 


Balance  340 ml 250 ml











Justifications for Admission


Other Justification














ADRIANA TENORIO MD            Apr 3, 2021 11:05

## 2021-04-03 NOTE — PDOC
PROGRESS NOTES


Date of Service


DATE: 4/3/21 


TIME: 10:07





Subjective


Subjective


She denies any significant back pain and not taking any medicine for pain.





Objective


Objective





Vital Signs








  Date Time  Temp Pulse Resp B/P (MAP) Pulse Ox O2 Delivery O2 Flow Rate FiO2


 


4/3/21 08:22  78  127/58    


 


4/3/21 07:12 97.7  18  90 Room Air  





 97.7       














Intake and Output 


 


 4/3/21





 07:00


 


Intake Total 590 ml


 


Output Total 0 ml


 


Balance 590 ml


 


 


 


Intake Oral 590 ml


 


Output Urine Total 0 ml


 


# Voids 5











Physical Exam


Physical Exam


She is alert,sitting at edge of bed and she got on to a supine position in bed 

without any assistance and without any pain complaint. She is anxious about 

falls if she tries to move by herself. I spooke to  and plans for 

kyphoplasty on 4/5/2021.





Assessment


Assessment


Problems


Medical Problems:


(1) Back pain


Status: Acute  











Plan


Plan of Care


Agree with plans.





Comment


Review of Relevant


I have reviewed the following items arianna (where applicable) has been applied.


Labs





Laboratory Tests








Test


 4/1/21


16:41 4/1/21


21:11 4/2/21


03:54 4/2/21


07:09


 


Glucose (Fingerstick)


 115 mg/dL


(70-99) 153 mg/dL


(70-99) 


 114 mg/dL


(70-99)


 


Prothrombin Time


 


 


 23.3 SEC


(11.7-14.0) 





 


Prothromb Time International


Ratio 


 


 2.1 (0.8-1.1) 


 





 


Sodium Level


 


 


 140 mmol/L


(136-145) 





 


Potassium Level


 


 


 4.2 mmol/L


(3.5-5.1) 





 


Chloride Level


 


 


 104 mmol/L


() 





 


Carbon Dioxide Level


 


 


 29 mmol/L


(21-32) 





 


Anion Gap   7 (6-14)  


 


Blood Urea Nitrogen


 


 


 26 mg/dL


(7-20) 





 


Creatinine


 


 


 1.1 mg/dL


(0.6-1.0) 





 


Estimated GFR


(Cockcroft-Gault) 


 


 47.1 


 





 


Glucose Level


 


 


 72 mg/dL


(70-99) 





 


Calcium Level


 


 


 8.9 mg/dL


(8.5-10.1) 





 


Magnesium Level


 


 


 1.9 mg/dL


(1.8-2.4) 





 


Test


 4/2/21


10:59 4/2/21


16:10 4/2/21


16:34 4/2/21


21:08


 


Glucose (Fingerstick)


 144 mg/dL


(70-99) 


 138 mg/dL


(70-99) 146 mg/dL


(70-99)


 


SARS-CoV-2 Antigen (Rapid)


 


 Negative


(NEGATIVE) 


 





 


Test


 4/3/21


04:30 4/3/21


07:02 


 





 


Prothrombin Time


 20.9 SEC


(11.7-14.0) 


 


 





 


Prothromb Time International


Ratio 1.8 (0.8-1.1) 


 


 


 





 


Glucose (Fingerstick)


 


 118 mg/dL


(70-99) 


 











Laboratory Tests








Test


 4/2/21


10:59 4/2/21


16:10 4/2/21


16:34 4/2/21


21:08


 


Glucose (Fingerstick)


 144 mg/dL


(70-99) 


 138 mg/dL


(70-99) 146 mg/dL


(70-99)


 


SARS-CoV-2 Antigen (Rapid)


 


 Negative


(NEGATIVE) 


 





 


Test


 4/3/21


04:30 4/3/21


07:02 


 





 


Prothrombin Time


 20.9 SEC


(11.7-14.0) 


 


 





 


Prothromb Time International


Ratio 1.8 (0.8-1.1) 


 


 


 





 


Glucose (Fingerstick)


 


 118 mg/dL


(70-99) 


 











Medications





Current Medications


Ondansetron HCl (Zofran) 4 mg PRN Q8HRS  PRN IV NAUSEA/VOMITING;  Start 3/31/21 

at 16:30;  Stop 4/1/21 at 16:29;  Status DC


Morphine Sulfate (Morphine Sulfate) 2 mg PRN Q2HR  PRN IV PAIN;  Start 3/31/21 

at 16:30;  Stop 3/31/21 at 17:45;  Status DC


Morphine Sulfate (Morphine Sulfate) 1 mg PRN Q4HRS  PRN IV SEVERE PAIN 7-10;  St

art 3/31/21 at 17:15


Amlodipine Besylate (Norvasc) 10 mg DAILY PO  Last administered on 4/3/21at 

08:22;  Start 4/1/21 at 09:00


Carvedilol (Coreg) 6.25 mg BIDWMEALS PO  Last administered on 4/3/21at 08:21;  

Start 3/31/21 at 21:00


Cyclobenzaprine HCl (Flexeril) 5 mg PRN TID  PRN PO MUSCLE SPASMS Last 

administered on 3/31/21at 21:50;  Start 3/31/21 at 17:15


Furosemide (Lasix) 40 mg DAILY PO  Last administered on 4/3/21at 08:20;  Start 

4/1/21 at 09:00


Glipizide (Glucotrol) 2.5 mg DAILYWBKFT PO  Last administered on 4/3/21at 08:22;

 Start 4/1/21 at 08:00


Acetaminophen/ Hydrocodone Bitart (Lortab 5/325) 1 tab QHS PO  Last administered

on 4/2/21at 21:42;  Start 3/31/21 at 21:00


Acetaminophen/ Hydrocodone Bitart (Lortab 5/325) 1 tab PRN Q4HRS  PRN PO SEVERE 

PAIN 7-10;  Start 3/31/21 at 17:15


Losartan Potassium (Cozaar) 50 mg DAILY PO  Last administered on 4/3/21at 08:22;

 Start 4/1/21 at 09:00


Atorvastatin Calcium (Lipitor) 10 mg QHS PO  Last administered on 4/2/21at 

21:42;  Start 3/31/21 at 21:00


Metformin HCl (Glucophage) 500 mg BIDWMEALS PO  Last administered on 4/3/21at 

08:21;  Start 4/1/21 at 08:00


Polyethylene Glycol (miraLAX PACKET) 17 gm DAILY PO  Last administered on 

4/3/21at 08:23;  Start 4/1/21 at 09:00


Potassium Chloride (Klor-Con) 20 meq DAILY PO  Last administered on 4/1/21at 

08:54;  Start 4/1/21 at 09:00;  Stop 4/1/21 at 10:21;  Status DC


Warfarin Sodium (Coumadin) 1.5 mg DAILY16 PO  Last administered on 3/31/21at 

19:30;  Start 3/31/21 at 19:30;  Stop 4/2/21 at 04:49;  Status DC


Acetaminophen (Tylenol) 650 mg PRN Q6HRS  PRN PO MILD PAIN / TEMP > 100.3'F;  

Start 3/31/21 at 17:15


Magnesium Hydroxide (Milk Of Magnesia) 2,400 mg PRN DAILY  PRN PO CONSTIPATION; 

Start 3/31/21 at 17:15


Warfarin Sodium (Coumadin Per Physician) 1 each PRN DAILY  PRN MC SEE COMMENTS 

Last administered on 4/1/21at 15:54;  Start 3/31/21 at 18:30


Potassium Chloride (Klor-Con) 20 meq BID PO  Last administered on 4/3/21at 

08:23;  Start 4/1/21 at 21:00


Ceftriaxone Sodium (Rocephin) 1 gm Q24H IVP  Last administered on 4/2/21at 

14:04;  Start 4/1/21 at 13:00


Warfarin Sodium (Coumadin) 1 mg DAILY16 PO ;  Start 4/2/21 at 16:00;  Stop 

4/2/21 at 10:15;  Status DC


Warfarin Sodium (Coumadin) 0.5 mg DAILY16 PO ;  Start 4/2/21 at 04:49;  Stop 

4/2/21 at 10:15;  Status DC


Vitamin D (Vitamin D3) 2,000 unit DAILY PO  Last administered on 4/3/21at 08:23;

 Start 4/3/21 at 09:00


Lactobacillus Rhamnosus (Culturelle) 1 cap BID PO  Last administered on 4/3/21at

08:22;  Start 4/2/21 at 21:00





Active Scripts


Active


Hydrocodone-Apap 5-325  ** (Hydrocodone Bit/Acetaminophen) 1 Tab Tablet 1 Tab PO

PRN Q4HRS PRN


Coumadin (Warfarin Sodium) 2.5 Mg Tablet 2 Mg PO DAILY16


Furosemide 40 Mg Tablet 40 Mg PO DAILY


Reported


Tramadol Hcl 50 Mg Tablet 1 Tab PO PRN Q6HRS PRN


Carvedilol 25 Mg Tablet 6.25 Mg PO BIDWMEALS


Aspirin Ec (Aspirin) 81 Mg Tablet.dr 81 Mg PO 


Potassium Chloride ** (Potassium Chloride) 20 Meq Tablet.er 20 Meq PO DAILY


Lovastatin 40 Mg Tablet 40 Mg PO HS


Metformin Hcl 500 Mg Tablet 500 Mg PO BIDWMEALS


Tylenol (Acetaminophen) 325 Mg Tablet 1-2 Tab PO PRN Q4HRS


Losartan Potassium 100 Mg Tablet 50 Mg PO DAILY


Glipizide 5 Mg Tablet 5 Mg PO DAILY


Amlodipine Besylate 10 Mg Tablet 10 Mg PO DAILY


Vitals/I & O





Vital Sign - Last 24 Hours








 4/2/21 4/2/21 4/2/21 4/2/21





 11:00 14:05 14:05 15:00


 


Temp 97.4   97.5





 97.4   97.5


 


Pulse 97 97 97 103


 


Resp 20   20


 


B/P (MAP) 170/70 (103) 170/70 170/70 179/74 (109)


 


Pulse Ox 96   97


 


O2 Delivery Room Air   Room Air


 


    





    





 4/2/21 4/2/21 4/2/21 4/2/21





 17:15 19:00 19:30 21:42


 


Temp  97.9  





  97.9  


 


Pulse 103 90  


 


Resp  18  20


 


B/P (MAP) 179/74 144/63 (90)  


 


Pulse Ox  99  


 


O2 Delivery  Room Air Room Air Room Air


 


    





    





 4/2/21 4/2/21 4/3/21 4/3/21





 22:42 23:00 02:59 07:12


 


Temp  98.9 98.2 97.7





  98.9 98.2 97.7


 


Pulse  92 90 78


 


Resp 20 18 18 18


 


B/P (MAP)  127/55 (79) 130/80 (97) 127/58 (81)


 


Pulse Ox  96 100 90


 


O2 Delivery Room Air Room Air Room Air Room Air


 


    





    





 4/3/21 4/3/21 4/3/21 





 08:21 08:22 08:22 


 


Pulse 78 78 78 


 


B/P (MAP) 127/58 127/58 127/58 














Intake and Output   


 


 4/2/21 4/2/21 4/3/21





 15:00 23:00 07:00


 


Intake Total  340 ml 250 ml


 


Output Total   0 ml


 


Balance  340 ml 250 ml











Justifications for Admission


Other Justification














RYAN TRIPP MD             Apr 3, 2021 10:11

## 2021-04-04 VITALS — DIASTOLIC BLOOD PRESSURE: 60 MMHG | SYSTOLIC BLOOD PRESSURE: 135 MMHG

## 2021-04-04 VITALS — SYSTOLIC BLOOD PRESSURE: 129 MMHG | DIASTOLIC BLOOD PRESSURE: 64 MMHG

## 2021-04-04 VITALS — DIASTOLIC BLOOD PRESSURE: 70 MMHG | SYSTOLIC BLOOD PRESSURE: 163 MMHG

## 2021-04-04 VITALS — SYSTOLIC BLOOD PRESSURE: 122 MMHG | DIASTOLIC BLOOD PRESSURE: 46 MMHG

## 2021-04-04 VITALS — DIASTOLIC BLOOD PRESSURE: 57 MMHG | SYSTOLIC BLOOD PRESSURE: 123 MMHG

## 2021-04-04 VITALS — DIASTOLIC BLOOD PRESSURE: 56 MMHG | SYSTOLIC BLOOD PRESSURE: 140 MMHG

## 2021-04-04 LAB
ANION GAP SERPL CALC-SCNC: 9 MMOL/L (ref 6–14)
BASOPHILS # BLD AUTO: 0 X10^3/UL (ref 0–0.2)
BASOPHILS NFR BLD: 0 % (ref 0–3)
BUN SERPL-MCNC: 28 MG/DL (ref 7–20)
CALCIUM SERPL-MCNC: 9 MG/DL (ref 8.5–10.1)
CHLORIDE SERPL-SCNC: 105 MMOL/L (ref 98–107)
CO2 SERPL-SCNC: 27 MMOL/L (ref 21–32)
CREAT SERPL-MCNC: 1.1 MG/DL (ref 0.6–1)
EOSINOPHIL NFR BLD: 0 X10^3/UL (ref 0–0.7)
EOSINOPHIL NFR BLD: 1 % (ref 0–3)
ERYTHROCYTE [DISTWIDTH] IN BLOOD BY AUTOMATED COUNT: 14.9 % (ref 11.5–14.5)
GFR SERPLBLD BASED ON 1.73 SQ M-ARVRAT: 47.1 ML/MIN
GLUCOSE SERPL-MCNC: 89 MG/DL (ref 70–99)
HCT VFR BLD CALC: 37.1 % (ref 36–47)
HGB BLD-MCNC: 12.5 G/DL (ref 12–15.5)
LYMPHOCYTES # BLD: 1.5 X10^3/UL (ref 1–4.8)
LYMPHOCYTES NFR BLD AUTO: 33 % (ref 24–48)
MCH RBC QN AUTO: 33 PG (ref 25–35)
MCHC RBC AUTO-ENTMCNC: 34 G/DL (ref 31–37)
MCV RBC AUTO: 96 FL (ref 79–100)
MONO #: 0.8 X10^3/UL (ref 0–1.1)
MONOCYTES NFR BLD: 18 % (ref 0–9)
NEUT #: 2.1 X10^3/UL (ref 1.8–7.7)
NEUTROPHILS NFR BLD AUTO: 48 % (ref 31–73)
PLATELET # BLD AUTO: 228 X10^3/UL (ref 140–400)
POTASSIUM SERPL-SCNC: 4.6 MMOL/L (ref 3.5–5.1)
PROTHROMBIN TIME: 19.3 SEC (ref 11.7–14)
RBC # BLD AUTO: 3.86 X10^6/UL (ref 3.5–5.4)
SODIUM SERPL-SCNC: 141 MMOL/L (ref 136–145)
WBC # BLD AUTO: 4.4 X10^3/UL (ref 4–11)

## 2021-04-04 RX ADMIN — CARVEDILOL SCH MG: 6.25 TABLET, FILM COATED ORAL at 09:51

## 2021-04-04 RX ADMIN — Medication SCH CAP: at 09:51

## 2021-04-04 RX ADMIN — HYDROCODONE BITARTRATE AND ACETAMINOPHEN PRN TAB: 5; 325 TABLET ORAL at 13:35

## 2021-04-04 RX ADMIN — POLYETHYLENE GLYCOL 3350 SCH GM: 17 POWDER, FOR SOLUTION ORAL at 09:52

## 2021-04-04 RX ADMIN — FUROSEMIDE SCH MG: 40 TABLET ORAL at 09:51

## 2021-04-04 RX ADMIN — LOSARTAN POTASSIUM SCH MG: 50 TABLET ORAL at 09:53

## 2021-04-04 RX ADMIN — POTASSIUM CHLORIDE SCH MEQ: 1500 TABLET, EXTENDED RELEASE ORAL at 09:52

## 2021-04-04 RX ADMIN — CEFTRIAXONE SCH GM: 1 INJECTION, POWDER, FOR SOLUTION INTRAMUSCULAR; INTRAVENOUS at 13:15

## 2021-04-04 RX ADMIN — HYDROCODONE BITARTRATE AND ACETAMINOPHEN SCH TAB: 5; 325 TABLET ORAL at 21:33

## 2021-04-04 RX ADMIN — VITAMIN D, TAB 1000IU (100/BT) SCH UNIT: 25 TAB at 09:51

## 2021-04-04 RX ADMIN — Medication SCH CAP: at 21:32

## 2021-04-04 RX ADMIN — CARVEDILOL SCH MG: 6.25 TABLET, FILM COATED ORAL at 16:24

## 2021-04-04 RX ADMIN — ATORVASTATIN CALCIUM SCH MG: 10 TABLET, FILM COATED ORAL at 21:32

## 2021-04-04 NOTE — PDOC
Provider Note


Date of Service:


DATE: 4/4/21 


TIME: 11:13


Provider Note


discharge summary dictated # [id number]





Justifications for Admission


Other Justification














ADRIANA TENORIO MD            Apr 4, 2021 11:14

## 2021-04-04 NOTE — SNU/HH DC
DISCHARGE WITH HOME HEALTH


DISCHARGE INFORMATION:


Discharge Date:  Apr 5, 2021


Final Diagnosis:


Problems


Medical Problems:


(1) Back pain


Status: Acute  








Condition on Discharge:  Stable





CODE STATUS:


Code Status:  DNR/DNI





HOME HEALTH:


Face to Face:


I certify this patient is under my care and that I, or a nurse practitioner or 

physician's assistant working with me, had a face to face encounter that meets 

the physician face to face encounter requirements with this patient on 

[04/04/21].


RN For Eval/Treatment:  Yes


Physical Therapy For:  Evalulation/Treatment


Occupational Therapy For:  Evaluation/Treatment


Pt Meets Homebound Status:  Limited distance walking





POST DISCHARGE ORDERS:


Activity Instructions for Disc:  Activity as tolerated


Weight Bearing Status after Di:  As tolerated


DIET AFTER DISCHARGE:  ADA





CHECKS AFTER DISCHARGE:


Checks after discharge:  Check blood press - daily, Check blood sugar, ac/hs, 

Check your Temp as needed, Weigh Yourself Daily





FOLLOW-UP:


PCP to follow Home Health:


dr. tenorio


Follow up with:  dr. tenorio next week


Warfarin Follow UP:  4/12/21





TREATMENT/EQUIPMENT ORDERS:


Adaptive Equipment Issued:  None





CERTIFICATION STATEMENT:


Certification Statement:


Certification Statement: Based on the above finding, I certify that this patient

 is confined to the home and needs intermittent skilled nursing care, physical 

therapy and/or speech therapy, or continues to need occupational therapy.~ This 

patient is under my care, and I have initiated the establishment of the plan of 

care.~ This patient will be followed by myself or a community physician who will

 periodically review the plan of care.


Home Meds


Active Scripts


Hydrocodone Bit/Acetaminophen (HYDROCODONE-APAP 5-325  **) 1 Tab Tablet, 1 TAB 

PO QHS for lumbar vertebral fracture, #30 TAB


   Prov:ADRIANA TENORIO MD         4/4/21


Warfarin Sodium (WARFARIN SODIUM) 1 Mg Tablet, 1.5 MG PO DAILY for atrial 

fibrillation, #30 TAB


   Prov:ADRIANA TENORIO MD         4/4/21


Cyclobenzaprine Hcl (CYCLOBENZAPRINE HCL) 10 Mg Tablet, 5 MG PO PRN TID PRN for 

MUSCLE SPASMS, #30 TAB


   Prov:ADRIANA TENORIO MD         4/4/21


Cholecalciferol (Vitamin D3) (Vitamin D3) 25 Mcg Tablet, 2000 UNIT PO DAILY for 

osteoporosis, #30 TAB


   Prov:ADRIANA TENORIO MD         4/4/21


Glipizide (GLIPIZIDE) 5 Mg Tablet, 2.5 MG PO DAILYWBKFT for diabetes, #30 TAB


   Prov:ADRIANA TENORIO MD         4/4/21


Furosemide (FUROSEMIDE) 40 Mg Tablet, 40 MG PO DAILY, #30 MG


   Prov:ADRIANA TENORIO MD         4/28/17


Reported Medications


Carvedilol (CARVEDILOL) 25 Mg Tablet, 6.25 MG PO BIDWMEALS for CARDIAC, TAB


   12/13/19


Aspirin (ASPIRIN EC) 81 Mg Tablet.dr, 81 MG PO, TAB.SR


   2/10/18


Potassium Chloride (POTASSIUM CHLORIDE **) 20 Meq Tablet.er, 20 MEQ PO DAILY, 

TAB.SR


   2/10/18


Lovastatin (LOVASTATIN) 40 Mg Tablet, 40 MG PO HS, TAB


   2/10/18


Metformin Hcl (METFORMIN HCL) 500 Mg Tablet, 500 MG PO BIDWMEALS for ANTI-

DIABETIC, TAB 0 Refills


   2/10/18


Acetaminophen (TYLENOL) 325 Mg Tablet, 1-2 TAB PO PRN Q4HRS for PAIN, #30 TAB


   4/25/17


Losartan Potassium (LOSARTAN POTASSIUM) 100 Mg Tablet, 50 MG PO DAILY for htn, 

TAB


   4/25/17


Amlodipine Besylate (AMLODIPINE BESYLATE) 10 Mg Tablet, 10 MG PO DAILY, TAB


   4/25/17


Discontinued Reported Medications


Tramadol Hcl (TRAMADOL HCL) 50 Mg Tablet, 1 TAB PO PRN Q6HRS PRN for moderate 

pain


   4/1/21


Glipizide (GLIPIZIDE) 5 Mg Tablet, 5 MG PO DAILY, TAB


   4/25/17


Discontinued Scripts


Hydrocodone Bit/Acetaminophen (HYDROCODONE-APAP 5-325  **) 1 Tab Tablet, 1 TAB P

O PRN Q4HRS PRN for severe pain, #30 TAB


   Prov:ADRIANA TENORIO MD         12/14/19


Warfarin Sodium (COUMADIN) 2.5 Mg Tablet, 2 MG PO DAILY16 for atrial 

fibrillation, #30 TAB


   Prov:ADRIANA TENORIO MD         12/14/19











ADRIANA TENORIO MD            Apr 4, 2021 11:08

## 2021-04-04 NOTE — PDOC
PROGRESS NOTES


Date of Service


DATE: 4/4/21 


TIME: 10:51





Subjective


Subjective


notes back pain with changing positions and standing up and turning in bed. inr 

1.7. lab reviewed.





Objective


Objective





Vital Signs








  Date Time  Temp Pulse Resp B/P (MAP) Pulse Ox O2 Delivery O2 Flow Rate FiO2


 


4/4/21 10:41 97.7 96 18 135/60 (85) 97 Room Air  





 97.7       














Intake and Output 


 


 4/4/21





 06:59


 


 


 


# Voids 2











Physical Exam


Abdomen:  Soft


Heart:  Regular rate, Normal S1, Normal S2


Extremities:  No edema


General:  Alert


HEENT:  Atraumatic


Lungs:  Clear to auscultation


Neuro:  Normal speech


Psych/Mental Status:  Mental status NL


Skin:  No rashes





Assessment


Assessment


Problemsno syncope per cardiology. daughter witnessed fall and is a mechanical 

fall


2.  Low back pain  even prior to the fall.


3.  Acute  L3 vertebral fracture,


4.  Lumbar spinal stenosis.


5.  Lumbar muscle spasms.


6.  Diabetes mellitus type 2.


7.  Hypertension.


8.  Hyperlipidemia.


9.  Chronic diastolic congestive heart failure.


10.  Persistent atrial fibrillation, on Coumadin.


      disuse myopathy


      E coli uti PTA


Medical Problems:


(1) Back pain


Status: Acute  








Plan


Plan of Care


kyphoplasty tomorrow if inr 1.5 or less


analgesics prn


hold coumadin


PT and OT


rocephin


possible dismissal tomorrow after kyphplasty with home health





Comment


Review of Relevant


I have reviewed the following items arianna (where applicable) has been applied.


Labs





Laboratory Tests








Test


 4/2/21


10:59 4/2/21


16:10 4/2/21


16:34 4/2/21


21:08


 


Glucose (Fingerstick)


 144 mg/dL


(70-99) 


 138 mg/dL


(70-99) 146 mg/dL


(70-99)


 


Coronavirus (PCR)


 


 Not detected


(Not Detected) 


 





 


SARS-CoV-2 Antigen (Rapid)


 


 Negative


(NEGATIVE) 


 





 


Test


 4/3/21


04:30 4/3/21


07:02 4/3/21


11:01 4/3/21


16:10


 


Prothrombin Time


 20.9 SEC


(11.7-14.0) 


 


 





 


Prothromb Time International


Ratio 1.8 (0.8-1.1) 


 


 


 





 


Glucose (Fingerstick)


 


 118 mg/dL


(70-99) 194 mg/dL


(70-99) 126 mg/dL


(70-99)


 


Test


 4/3/21


20:40 4/4/21


06:15 4/4/21


07:02 





 


Glucose (Fingerstick)


 192 mg/dL


(70-99) 


 95 mg/dL


(70-99) 





 


White Blood Count


 


 4.4 x10^3/uL


(4.0-11.0) 


 





 


Red Blood Count


 


 3.86 x10^6/uL


(3.50-5.40) 


 





 


Hemoglobin


 


 12.5 g/dL


(12.0-15.5) 


 





 


Hematocrit


 


 37.1 %


(36.0-47.0) 


 





 


Mean Corpuscular Volume  96 fL ()   


 


Mean Corpuscular Hemoglobin  33 pg (25-35)   


 


Mean Corpuscular Hemoglobin


Concent 


 34 g/dL


(31-37) 


 





 


Red Cell Distribution Width


 


 14.9 %


(11.5-14.5) 


 





 


Platelet Count


 


 228 x10^3/uL


(140-400) 


 





 


Neutrophils (%) (Auto)  48 % (31-73)   


 


Lymphocytes (%) (Auto)  33 % (24-48)   


 


Monocytes (%) (Auto)  18 % (0-9)   


 


Eosinophils (%) (Auto)  1 % (0-3)   


 


Basophils (%) (Auto)  0 % (0-3)   


 


Neutrophils # (Auto)


 


 2.1 x10^3/uL


(1.8-7.7) 


 





 


Lymphocytes # (Auto)


 


 1.5 x10^3/uL


(1.0-4.8) 


 





 


Monocytes # (Auto)


 


 0.8 x10^3/uL


(0.0-1.1) 


 





 


Eosinophils # (Auto)


 


 0.0 x10^3/uL


(0.0-0.7) 


 





 


Basophils # (Auto)


 


 0.0 x10^3/uL


(0.0-0.2) 


 





 


Prothrombin Time


 


 19.3 SEC


(11.7-14.0) 


 





 


Prothromb Time International


Ratio 


 1.7 (0.8-1.1) 


 


 





 


Sodium Level


 


 141 mmol/L


(136-145) 


 





 


Potassium Level


 


 4.6 mmol/L


(3.5-5.1) 


 





 


Chloride Level


 


 105 mmol/L


() 


 





 


Carbon Dioxide Level


 


 27 mmol/L


(21-32) 


 





 


Anion Gap  9 (6-14)   


 


Blood Urea Nitrogen


 


 28 mg/dL


(7-20) 


 





 


Creatinine


 


 1.1 mg/dL


(0.6-1.0) 


 





 


Estimated GFR


(Cockcroft-Gault) 


 47.1 


 


 





 


Glucose Level


 


 89 mg/dL


(70-99) 


 





 


Calcium Level


 


 9.0 mg/dL


(8.5-10.1) 


 











Laboratory Tests








Test


 4/3/21


11:01 4/3/21


16:10 4/3/21


20:40 4/4/21


06:15


 


Glucose (Fingerstick)


 194 mg/dL


(70-99) 126 mg/dL


(70-99) 192 mg/dL


(70-99) 





 


White Blood Count


 


 


 


 4.4 x10^3/uL


(4.0-11.0)


 


Red Blood Count


 


 


 


 3.86 x10^6/uL


(3.50-5.40)


 


Hemoglobin


 


 


 


 12.5 g/dL


(12.0-15.5)


 


Hematocrit


 


 


 


 37.1 %


(36.0-47.0)


 


Mean Corpuscular Volume    96 fL () 


 


Mean Corpuscular Hemoglobin    33 pg (25-35) 


 


Mean Corpuscular Hemoglobin


Concent 


 


 


 34 g/dL


(31-37)


 


Red Cell Distribution Width


 


 


 


 14.9 %


(11.5-14.5)


 


Platelet Count


 


 


 


 228 x10^3/uL


(140-400)


 


Neutrophils (%) (Auto)    48 % (31-73) 


 


Lymphocytes (%) (Auto)    33 % (24-48) 


 


Monocytes (%) (Auto)    18 % (0-9) 


 


Eosinophils (%) (Auto)    1 % (0-3) 


 


Basophils (%) (Auto)    0 % (0-3) 


 


Neutrophils # (Auto)


 


 


 


 2.1 x10^3/uL


(1.8-7.7)


 


Lymphocytes # (Auto)


 


 


 


 1.5 x10^3/uL


(1.0-4.8)


 


Monocytes # (Auto)


 


 


 


 0.8 x10^3/uL


(0.0-1.1)


 


Eosinophils # (Auto)


 


 


 


 0.0 x10^3/uL


(0.0-0.7)


 


Basophils # (Auto)


 


 


 


 0.0 x10^3/uL


(0.0-0.2)


 


Prothrombin Time


 


 


 


 19.3 SEC


(11.7-14.0)


 


Prothromb Time International


Ratio 


 


 


 1.7 (0.8-1.1) 





 


Sodium Level


 


 


 


 141 mmol/L


(136-145)


 


Potassium Level


 


 


 


 4.6 mmol/L


(3.5-5.1)


 


Chloride Level


 


 


 


 105 mmol/L


()


 


Carbon Dioxide Level


 


 


 


 27 mmol/L


(21-32)


 


Anion Gap    9 (6-14) 


 


Blood Urea Nitrogen


 


 


 


 28 mg/dL


(7-20)


 


Creatinine


 


 


 


 1.1 mg/dL


(0.6-1.0)


 


Estimated GFR


(Cockcroft-Gault) 


 


 


 47.1 





 


Glucose Level


 


 


 


 89 mg/dL


(70-99)


 


Calcium Level


 


 


 


 9.0 mg/dL


(8.5-10.1)


 


Test


 4/4/21


07:02 


 


 





 


Glucose (Fingerstick)


 95 mg/dL


(70-99) 


 


 











Medications





Current Medications


Ondansetron HCl (Zofran) 4 mg PRN Q8HRS  PRN IV NAUSEA/VOMITING;  Start 3/31/21 

at 16:30;  Stop 4/1/21 at 16:29;  Status DC


Morphine Sulfate (Morphine Sulfate) 2 mg PRN Q2HR  PRN IV PAIN;  Start 3/31/21 

at 16:30;  Stop 3/31/21 at 17:45;  Status DC


Morphine Sulfate (Morphine Sulfate) 1 mg PRN Q4HRS  PRN IV SEVERE PAIN 7-10;  

Start 3/31/21 at 17:15


Amlodipine Besylate (Norvasc) 10 mg DAILY PO  Last administered on 4/4/21at 

09:53;  Start 4/1/21 at 09:00


Carvedilol (Coreg) 6.25 mg BIDWMEALS PO  Last administered on 4/4/21at 09:51;  

Start 3/31/21 at 21:00


Cyclobenzaprine HCl (Flexeril) 5 mg PRN TID  PRN PO MUSCLE SPASMS Last 

administered on 3/31/21at 21:50;  Start 3/31/21 at 17:15


Furosemide (Lasix) 40 mg DAILY PO  Last administered on 4/4/21at 09:51;  Start 

4/1/21 at 09:00


Glipizide (Glucotrol) 2.5 mg DAILYWBKFT PO  Last administered on 4/4/21at 09:52;

 Start 4/1/21 at 08:00


Acetaminophen/ Hydrocodone Bitart (Lortab 5/325) 1 tab QHS PO  Last administered

on 4/3/21at 22:16;  Start 3/31/21 at 21:00


Acetaminophen/ Hydrocodone Bitart (Lortab 5/325) 1 tab PRN Q4HRS  PRN PO SEVERE 

PAIN 7-10;  Start 3/31/21 at 17:15


Losartan Potassium (Cozaar) 50 mg DAILY PO  Last administered on 4/4/21at 09:53;

 Start 4/1/21 at 09:00


Atorvastatin Calcium (Lipitor) 10 mg QHS PO  Last administered on 4/3/21at 

22:16;  Start 3/31/21 at 21:00


Metformin HCl (Glucophage) 500 mg BIDWMEALS PO  Last administered on 4/4/21at 

09:51;  Start 4/1/21 at 08:00


Polyethylene Glycol (miraLAX PACKET) 17 gm DAILY PO  Last administered on 

4/4/21at 09:52;  Start 4/1/21 at 09:00


Potassium Chloride (Klor-Con) 20 meq DAILY PO  Last administered on 4/1/21at 

08:54;  Start 4/1/21 at 09:00;  Stop 4/1/21 at 10:21;  Status DC


Warfarin Sodium (Coumadin) 1.5 mg DAILY16 PO  Last administered on 3/31/21at 

19:30;  Start 3/31/21 at 19:30;  Stop 4/2/21 at 04:49;  Status DC


Acetaminophen (Tylenol) 650 mg PRN Q6HRS  PRN PO MILD PAIN / TEMP > 100.3'F;  

Start 3/31/21 at 17:15


Magnesium Hydroxide (Milk Of Magnesia) 2,400 mg PRN DAILY  PRN PO CONSTIPATION; 

Start 3/31/21 at 17:15


Warfarin Sodium (Coumadin Per Physician) 1 each PRN DAILY  PRN MC SEE COMMENTS 

Last administered on 4/3/21at 11:24;  Start 3/31/21 at 18:30


Potassium Chloride (Klor-Con) 20 meq BID PO  Last administered on 4/4/21at 

09:52;  Start 4/1/21 at 21:00;  Stop 4/4/21 at 10:45;  Status DC


Ceftriaxone Sodium (Rocephin) 1 gm Q24H IVP  Last administered on 4/3/21at 

12:46;  Start 4/1/21 at 13:00


Warfarin Sodium (Coumadin) 1 mg DAILY16 PO ;  Start 4/2/21 at 16:00;  Stop 

4/2/21 at 10:15;  Status DC


Warfarin Sodium (Coumadin) 0.5 mg DAILY16 PO ;  Start 4/2/21 at 04:49;  Stop 

4/2/21 at 10:15;  Status DC


Vitamin D (Vitamin D3) 2,000 unit DAILY PO  Last administered on 4/4/21at 09:51;

 Start 4/3/21 at 09:00


Lactobacillus Rhamnosus (Culturelle) 1 cap BID PO  Last administered on 4/4/21at

09:51;  Start 4/2/21 at 21:00


Potassium Chloride (Klor-Con) 20 meq DAILY PO ;  Start 4/5/21 at 09:00





Active Scripts


Active


Hydrocodone-Apap 5-325  ** (Hydrocodone Bit/Acetaminophen) 1 Tab Tablet 1 Tab PO

PRN Q4HRS PRN


Coumadin (Warfarin Sodium) 2.5 Mg Tablet 2 Mg PO DAILY16


Furosemide 40 Mg Tablet 40 Mg PO DAILY


Reported


Tramadol Hcl 50 Mg Tablet 1 Tab PO PRN Q6HRS PRN


Carvedilol 25 Mg Tablet 6.25 Mg PO BIDWMEALS


Aspirin Ec (Aspirin) 81 Mg Tablet.dr 81 Mg PO 


Potassium Chloride ** (Potassium Chloride) 20 Meq Tablet.er 20 Meq PO DAILY


Lovastatin 40 Mg Tablet 40 Mg PO HS


Metformin Hcl 500 Mg Tablet 500 Mg PO BIDWMEALS


Tylenol (Acetaminophen) 325 Mg Tablet 1-2 Tab PO PRN Q4HRS


Losartan Potassium 100 Mg Tablet 50 Mg PO DAILY


Glipizide 5 Mg Tablet 5 Mg PO DAILY


Amlodipine Besylate 10 Mg Tablet 10 Mg PO DAILY


Vitals/I & O





Vital Sign - Last 24 Hours








 4/3/21 4/3/21 4/3/21 4/3/21





 14:34 17:31 19:00 20:00


 


Temp 97.9  98.4 





 97.9  98.4 


 


Pulse 89 89 114 


 


Resp 18  18 


 


B/P (MAP) 105/55 (72) 105/55 100/80 (87) 


 


Pulse Ox 95  96 


 


O2 Delivery Room Air  Room Air Room Air


 


    





    





 4/3/21 4/3/21 4/4/21 4/4/21





 22:16 23:00 03:00 07:15


 


Temp  97.9 98.6 97.7





  97.9 98.6 97.7


 


Pulse  83 85 89


 


Resp 20 18 18 18


 


B/P (MAP)  158/60 (92) 123/57 (79) 163/70 (101)


 


Pulse Ox  95 96 97


 


O2 Delivery Room Air Room Air Room Air Room Air


 


    





    





 4/4/21 4/4/21 4/4/21 4/4/21





 09:51 09:53 09:53 10:41


 


Temp    97.7





    97.7


 


Pulse 89 89 89 96


 


Resp    18


 


B/P (MAP) 163/70 163/70 163/70 135/60 (85)


 


Pulse Ox    97


 


O2 Delivery    Room Air











Justifications for Admission


Other Justification














ADRIANA TENORIO MD            Apr 4, 2021 10:54

## 2021-04-04 NOTE — DS
DATE OF DISCHARGE:  04/04/2021



CONSULTANTS:

1.  Fadi Wisdom MD.

2.  Shoshana Nguyen MD.

3.  Interventional radiologist.



FINAL DIAGNOSES:

1.  Intractable low back pain secondary to an acute L3 vertebral compression

fracture, most likely from osteoporosis.

2.  Mechanical fall.

3.  Lumbar spinal stenosis.

4.  Lumbar muscle spasms.

5.  Diabetes mellitus type 2.

6.  Hypertension.

7.  Hyperlipidemia.

8.  Chronic diastolic congestive heart failure.

9.  Persistent atrial fibrillation, treated with Coumadin.

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

11.  Escherichia coli urinary tract infection prior to admission.



HOSPITAL COURSE:  The patient is an 86-year-old white female with history of

diabetes mellitus type 2, hypertension, hyperlipidemia, persistent atrial

fibrillation, treated with Coumadin, who notes a several week history of low

back pain.  X-ray of the lumbar spine as an outpatient showed lumbar spinal

stenosis.  She was seen by Dr. Nguyen as an outpatient who ordered a back brace. 

The patient was in the kitchen and was using a grasper to  a _____.  The

next thing she knew she was on the floor.  She hit her head.  She sought help at

the [facility] Emergency Room where CAT scan of the head and

cervical spine were negative for acute fracture.  She was complaining of

continued pain in her lower back and underwent a CAT scan of the lumbar spine,

which showed an L3 compression fracture and underwent a bone scan and the L3

vertebral fracture did take out the isotope consistent with acute L3 lumbar

vertebral compression fracture.  I spoke with interventional radiologist and

wanted to get an MRI of the lumbar spine and to make sure that there was a

lumber epidural hematoma, which she was on Coumadin and the MRI confirmed the

acute L3 vertebral compression fracture with 40% loss of height.  She had

difficulty getting up because of severe pain off of a chair and to stand up and

also had difficulty with turning in bed and getting out of bed and she received

physical therapy and occupational therapy as she was admitted to [facility] following a fall on 03/31/2021.  Her Coumadin has been held in

anticipation for the L3 lumbar vertebroplasty that can hopefully be done on

04/05/2021 if her INR is 1.5 or less and her INR today is 1.7 as the Coumadin

continues to be held.  Prior to admission, she had an E. coli urinary tract

infection.  It was sensitive to ciprofloxacin, which she treated, but it caused

visual complaints.  Then, she was switched to Bactrim double strength prior to

admission.  She is currently receiving IV Rocephin as the organism was resistant

to cefazolin and on Bactrim can increase the INR.  So it interacts with

Coumadin, so she will complete her course of IV Rocephin, which was started

04/01/2021 here.  She will not need any further antibiotics for urinary tract

infection, does not have any dysuria at this time.  Her blood sugars were under

good control.  It is anticipated she will be dismissed tomorrow after

kyphoplasty if okay with the interventional radiologist and she will start

Coumadin when okay with the interventional radiologist.  She was seen by Dr. Wisdom for her atrial fibrillation and all in all, he felt that she did not

have a syncopal episode.  She will be dismissed on amlodipine 10 mg every day,

aspirin 81 mg every day, carvedilol 6.25 mg b.i.d., Flexeril 5 mg t.i.d. p.r.n.,

furosemide 40 mg every day, glipizide 2.5 mg every day, Norco 5/325 one tablet

at bedtime, losartan 50 mg every day, lovastatin 40 mg every day, metformin 500

mg b.i.d., MiraLax 17 grams daily, potassium chloride 20 mEq every day and

Coumadin 1.5 mg every day, which will be resumed when it is okay with

interventional radiologist after the procedure tomorrow.  She will have a

protime and INR checked on 04/12/2021 and has it done weekly.  She will be

dismissed to home with home health with physical and occupational therapy, make

an appointment to see Dr. Terrell in the office next week.

 



______________________________

ADRIANA TERRELL MD DR:  CEASAR/hilario  JOB#:  [id number] / [id number]

DD:  04/04/2021 11:13  DT:  04/04/2021 20:55

## 2021-04-05 VITALS — DIASTOLIC BLOOD PRESSURE: 52 MMHG | SYSTOLIC BLOOD PRESSURE: 146 MMHG

## 2021-04-05 VITALS — SYSTOLIC BLOOD PRESSURE: 140 MMHG | DIASTOLIC BLOOD PRESSURE: 53 MMHG

## 2021-04-05 VITALS — SYSTOLIC BLOOD PRESSURE: 138 MMHG | DIASTOLIC BLOOD PRESSURE: 82 MMHG

## 2021-04-05 VITALS — SYSTOLIC BLOOD PRESSURE: 127 MMHG | DIASTOLIC BLOOD PRESSURE: 44 MMHG

## 2021-04-05 VITALS — DIASTOLIC BLOOD PRESSURE: 47 MMHG | SYSTOLIC BLOOD PRESSURE: 136 MMHG

## 2021-04-05 LAB — PROTHROMBIN TIME: 16.7 SEC (ref 11.7–14)

## 2021-04-05 PROCEDURE — 0QU03JZ SUPPLEMENT LUMBAR VERTEBRA WITH SYNTHETIC SUBSTITUTE, PERCUTANEOUS APPROACH: ICD-10-PCS | Performed by: RADIOLOGY

## 2021-04-05 PROCEDURE — 0QS03ZZ REPOSITION LUMBAR VERTEBRA, PERCUTANEOUS APPROACH: ICD-10-PCS | Performed by: RADIOLOGY

## 2021-04-05 RX ADMIN — CEFTRIAXONE SCH GM: 1 INJECTION, POWDER, FOR SOLUTION INTRAMUSCULAR; INTRAVENOUS at 12:40

## 2021-04-05 RX ADMIN — POLYETHYLENE GLYCOL 3350 SCH GM: 17 POWDER, FOR SOLUTION ORAL at 08:22

## 2021-04-05 RX ADMIN — FUROSEMIDE SCH MG: 40 TABLET ORAL at 08:22

## 2021-04-05 RX ADMIN — CARVEDILOL SCH MG: 6.25 TABLET, FILM COATED ORAL at 08:13

## 2021-04-05 RX ADMIN — HYDROCODONE BITARTRATE AND ACETAMINOPHEN PRN TAB: 5; 325 TABLET ORAL at 12:53

## 2021-04-05 RX ADMIN — LOSARTAN POTASSIUM SCH MG: 50 TABLET ORAL at 08:21

## 2021-04-05 RX ADMIN — Medication SCH CAP: at 08:21

## 2021-04-05 RX ADMIN — VITAMIN D, TAB 1000IU (100/BT) SCH UNIT: 25 TAB at 08:22

## 2021-04-05 NOTE — NUR
SW following. Discussed with RN, pt from home, room air, ada diet, COVID-19 negative. Pt 
having a kyphoplasty today, can discharge home with [facility], if okay with Cristal Alvarez RN notified. SW will continue to follow.

## 2021-04-05 NOTE — PDOC
PROGRESS NOTES


Date of Service


DATE: 4/5/21 


TIME: 09:12





Subjective


Subjective


No new complaints.





Objective


Objective





Vital Signs








  Date Time  Temp Pulse Resp B/P (MAP) Pulse Ox O2 Delivery O2 Flow Rate FiO2


 


4/5/21 08:13  99  146/52    


 


4/5/21 07:00 97.8  18  97 Room Air  





 97.8       














Intake and Output 


 


 4/5/21





 07:00


 


Output Total 200 ml


 


Balance -200 ml


 


 


 


Output Urine Total 200 ml


 


# Voids 1


 


# Bowel Movements 1











Physical Exam


Physical Exam


She is alert,supine in bed and no change with his neurological status and she 

still requires some assistance with scooting up in bed.





Assessment


Assessment


Problems


Medical Problems:


(1) Back pain


Status: Acute  











Plan


Plan of Care


Home with home health physical therapy follow up after kyphoplasty.





Comment


Review of Relevant


I have reviewed the following items arianna (where applicable) has been applied.


Labs





Laboratory Tests








Test


 4/3/21


11:01 4/3/21


16:10 4/3/21


20:40 4/4/21


06:15


 


Glucose (Fingerstick)


 194 mg/dL


(70-99) 126 mg/dL


(70-99) 192 mg/dL


(70-99) 





 


White Blood Count


 


 


 


 4.4 x10^3/uL


(4.0-11.0)


 


Red Blood Count


 


 


 


 3.86 x10^6/uL


(3.50-5.40)


 


Hemoglobin


 


 


 


 12.5 g/dL


(12.0-15.5)


 


Hematocrit


 


 


 


 37.1 %


(36.0-47.0)


 


Mean Corpuscular Volume    96 fL () 


 


Mean Corpuscular Hemoglobin    33 pg (25-35) 


 


Mean Corpuscular Hemoglobin


Concent 


 


 


 34 g/dL


(31-37)


 


Red Cell Distribution Width


 


 


 


 14.9 %


(11.5-14.5)


 


Platelet Count


 


 


 


 228 x10^3/uL


(140-400)


 


Neutrophils (%) (Auto)    48 % (31-73) 


 


Lymphocytes (%) (Auto)    33 % (24-48) 


 


Monocytes (%) (Auto)    18 % (0-9) 


 


Eosinophils (%) (Auto)    1 % (0-3) 


 


Basophils (%) (Auto)    0 % (0-3) 


 


Neutrophils # (Auto)


 


 


 


 2.1 x10^3/uL


(1.8-7.7)


 


Lymphocytes # (Auto)


 


 


 


 1.5 x10^3/uL


(1.0-4.8)


 


Monocytes # (Auto)


 


 


 


 0.8 x10^3/uL


(0.0-1.1)


 


Eosinophils # (Auto)


 


 


 


 0.0 x10^3/uL


(0.0-0.7)


 


Basophils # (Auto)


 


 


 


 0.0 x10^3/uL


(0.0-0.2)


 


Prothrombin Time


 


 


 


 19.3 SEC


(11.7-14.0)


 


Prothromb Time International


Ratio 


 


 


 1.7 (0.8-1.1) 





 


Sodium Level


 


 


 


 141 mmol/L


(136-145)


 


Potassium Level


 


 


 


 4.6 mmol/L


(3.5-5.1)


 


Chloride Level


 


 


 


 105 mmol/L


()


 


Carbon Dioxide Level


 


 


 


 27 mmol/L


(21-32)


 


Anion Gap    9 (6-14) 


 


Blood Urea Nitrogen


 


 


 


 28 mg/dL


(7-20)


 


Creatinine


 


 


 


 1.1 mg/dL


(0.6-1.0)


 


Estimated GFR


(Cockcroft-Gault) 


 


 


 47.1 





 


Glucose Level


 


 


 


 89 mg/dL


(70-99)


 


Calcium Level


 


 


 


 9.0 mg/dL


(8.5-10.1)


 


Test


 4/4/21


07:02 4/4/21


11:18 4/4/21


16:20 4/4/21


20:19


 


Glucose (Fingerstick)


 95 mg/dL


(70-99) 164 mg/dL


(70-99) 86 mg/dL


(70-99) 135 mg/dL


(70-99)


 


Test


 4/5/21


03:50 4/5/21


07:24 


 





 


Prothrombin Time


 16.7 SEC


(11.7-14.0) 


 


 





 


Prothromb Time International


Ratio 1.4 (0.8-1.1) 


 


 


 





 


Glucose (Fingerstick)


 


 94 mg/dL


(70-99) 


 











Laboratory Tests








Test


 4/4/21


11:18 4/4/21


16:20 4/4/21


20:19 4/5/21


03:50


 


Glucose (Fingerstick)


 164 mg/dL


(70-99) 86 mg/dL


(70-99) 135 mg/dL


(70-99) 





 


Prothrombin Time


 


 


 


 16.7 SEC


(11.7-14.0)


 


Prothromb Time International


Ratio 


 


 


 1.4 (0.8-1.1) 





 


Test


 4/5/21


07:24 


 


 





 


Glucose (Fingerstick)


 94 mg/dL


(70-99) 


 


 











Medications





Current Medications


Ondansetron HCl (Zofran) 4 mg PRN Q8HRS  PRN IV NAUSEA/VOMITING;  Start 3/31/21 

at 16:30;  Stop 4/1/21 at 16:29;  Status DC


Morphine Sulfate (Morphine Sulfate) 2 mg PRN Q2HR  PRN IV PAIN;  Start 3/31/21 

at 16:30;  Stop 3/31/21 at 17:45;  Status DC


Morphine Sulfate (Morphine Sulfate) 1 mg PRN Q4HRS  PRN IV SEVERE PAIN 7-10;  

Start 3/31/21 at 17:15


Amlodipine Besylate (Norvasc) 10 mg DAILY PO  Last administered on 4/4/21at 

09:53;  Start 4/1/21 at 09:00


Carvedilol (Coreg) 6.25 mg BIDWMEALS PO  Last administered on 4/5/21at 08:13;  

Start 3/31/21 at 21:00


Cyclobenzaprine HCl (Flexeril) 5 mg PRN TID  PRN PO MUSCLE SPASMS Last 

administered on 3/31/21at 21:50;  Start 3/31/21 at 17:15


Furosemide (Lasix) 40 mg DAILY PO  Last administered on 4/4/21at 09:51;  Start 

4/1/21 at 09:00


Glipizide (Glucotrol) 2.5 mg DAILYWBKFT PO  Last administered on 4/4/21at 09:52;

 Start 4/1/21 at 08:00


Acetaminophen/ Hydrocodone Bitart (Lortab 5/325) 1 tab QHS PO  Last administered

on 4/4/21at 21:33;  Start 3/31/21 at 21:00


Acetaminophen/ Hydrocodone Bitart (Lortab 5/325) 1 tab PRN Q4HRS  PRN PO SEVERE 

PAIN 7-10 Last administered on 4/4/21at 13:35;  Start 3/31/21 at 17:15


Losartan Potassium (Cozaar) 50 mg DAILY PO  Last administered on 4/4/21at 09:53;

 Start 4/1/21 at 09:00


Atorvastatin Calcium (Lipitor) 10 mg QHS PO  Last administered on 4/4/21at 

21:32;  Start 3/31/21 at 21:00


Metformin HCl (Glucophage) 500 mg BIDWMEALS PO  Last administered on 4/4/21at 

16:24;  Start 4/1/21 at 08:00


Polyethylene Glycol (miraLAX PACKET) 17 gm DAILY PO  Last administered on 

4/4/21at 09:52;  Start 4/1/21 at 09:00


Potassium Chloride (Klor-Con) 20 meq DAILY PO  Last administered on 4/1/21at 

08:54;  Start 4/1/21 at 09:00;  Stop 4/1/21 at 10:21;  Status DC


Warfarin Sodium (Coumadin) 1.5 mg DAILY16 PO  Last administered on 3/31/21at 

19:30;  Start 3/31/21 at 19:30;  Stop 4/2/21 at 04:49;  Status DC


Acetaminophen (Tylenol) 650 mg PRN Q6HRS  PRN PO MILD PAIN / TEMP > 100.3'F;  

Start 3/31/21 at 17:15


Magnesium Hydroxide (Milk Of Magnesia) 2,400 mg PRN DAILY  PRN PO CONSTIPATION; 

Start 3/31/21 at 17:15


Warfarin Sodium (Coumadin Per Physician) 1 each PRN DAILY  PRN MC SEE COMMENTS 

Last administered on 4/3/21at 11:24;  Start 3/31/21 at 18:30


Potassium Chloride (Klor-Con) 20 meq BID PO  Last administered on 4/4/21at 

09:52;  Start 4/1/21 at 21:00;  Stop 4/4/21 at 10:45;  Status DC


Ceftriaxone Sodium (Rocephin) 1 gm Q24H IVP  Last administered on 4/4/21at 

13:15;  Start 4/1/21 at 13:00


Warfarin Sodium (Coumadin) 1 mg DAILY16 PO ;  Start 4/2/21 at 16:00;  Stop 

4/2/21 at 10:15;  Status DC


Warfarin Sodium (Coumadin) 0.5 mg DAILY16 PO ;  Start 4/2/21 at 04:49;  Stop 

4/2/21 at 10:15;  Status DC


Vitamin D (Vitamin D3) 2,000 unit DAILY PO  Last administered on 4/4/21at 09:51;

 Start 4/3/21 at 09:00


Lactobacillus Rhamnosus (Culturelle) 1 cap BID PO  Last administered on 4/4/21at

21:32;  Start 4/2/21 at 21:00


Potassium Chloride (Klor-Con) 20 meq DAILY PO ;  Start 4/5/21 at 09:00





Active Scripts


Active


Hydrocodone-Apap 5-325  ** (Hydrocodone Bit/Acetaminophen) 1 Tab Tablet 1 Tab PO

QHS


Warfarin Sodium 1 Mg Tablet 1.5 Mg PO DAILY


Cyclobenzaprine Hcl 10 Mg Tablet 5 Mg PO PRN TID PRN


Vitamin D3 (Cholecalciferol (Vitamin D3)) 25 Mcg Tablet 2,000 Unit PO DAILY


Glipizide 5 Mg Tablet 2.5 Mg PO DAILYWBKFT


Furosemide 40 Mg Tablet 40 Mg PO DAILY


Reported


Carvedilol 25 Mg Tablet 6.25 Mg PO BIDWMEALS


Aspirin Ec (Aspirin) 81 Mg Tablet.dr 81 Mg PO 


Potassium Chloride ** (Potassium Chloride) 20 Meq Tablet.er 20 Meq PO DAILY


Lovastatin 40 Mg Tablet 40 Mg PO HS


Metformin Hcl 500 Mg Tablet 500 Mg PO BIDWMEALS


Tylenol (Acetaminophen) 325 Mg Tablet 1-2 Tab PO PRN Q4HRS


Losartan Potassium 100 Mg Tablet 50 Mg PO DAILY


Amlodipine Besylate 10 Mg Tablet 10 Mg PO DAILY


Vitals/I & O





Vital Sign - Last 24 Hours








 4/4/21 4/4/21 4/4/21 4/4/21





 09:51 09:53 09:53 10:41


 


Temp    97.7





    97.7


 


Pulse 89 89 89 96


 


Resp    18


 


B/P (MAP) 163/70 163/70 163/70 135/60 (85)


 


Pulse Ox    97


 


O2 Delivery    Room Air


 


    





    





 4/4/21 4/4/21 4/4/21 4/4/21





 13:35 14:35 14:49 16:24


 


Temp   98.0 





   98.0 


 


Pulse   89 89


 


Resp   18 


 


B/P (MAP)   129/64 (85) 129/64


 


Pulse Ox   94 


 


O2 Delivery Room Air Room Air Room Air 


 


    





    





 4/4/21 4/4/21 4/4/21 4/4/21





 19:00 19:30 21:33 22:30


 


Temp 98.3   





 98.3   


 


Pulse 76   


 


Resp 18  18 18


 


B/P (MAP) 122/46 (71)   


 


Pulse Ox 100   


 


O2 Delivery Room Air Room Air Room Air Room Air


 


    





    





 4/4/21 4/5/21 4/5/21 4/5/21





 23:05 03:17 07:00 08:13


 


Temp 97.4 97.6 97.8 





 97.4 97.6 97.8 


 


Pulse 95 80 99 99


 


Resp 18 18 18 


 


B/P (MAP) 140/56 (84) 127/44 (71) 146/52 (83) 146/52


 


Pulse Ox 97 95 97 


 


O2 Delivery Room Air Room Air Room Air 














Intake and Output   


 


 4/4/21 4/4/21 4/5/21





 15:00 23:00 07:00


 


Output Total   200 ml


 


Balance   -200 ml











Justifications for Admission


Other Justification














RYAN TRIPP MD             Apr 5, 2021 09:15

## 2021-04-05 NOTE — PDOC
PROGRESS NOTES


Date of Service


DATE: 4/5/21 


TIME: 08:06





Subjective


Subjective


has back pain with turning in bed and standing up . inr 1.4. discussed with 

daughter and her nurse.





Objective


Objective





Vital Signs








  Date Time  Temp Pulse Resp B/P (MAP) Pulse Ox O2 Delivery O2 Flow Rate FiO2


 


4/5/21 03:17 97.6 80 18 127/44 (71) 95 Room Air  





 97.6       














Intake and Output 


 


 4/5/21





 06:59


 


Output Total 200 ml


 


Balance -200 ml


 


 


 


Output Urine Total 200 ml


 


# Voids 1


 


# Bowel Movements 1











Physical Exam


Abdomen:  Soft


Heart:  Normal S1, Normal S2, No murmurs


Extremities:  No edema


General:  Alert


HEENT:  Atraumatic


Lungs:  Clear to auscultation


Neuro:  Normal speech


Psych/Mental Status:  Mental status NL


Skin:  No rashes





Assessment


Assessment


Problems


Medical Problems:no syncope per cardiology. daughter witnessed fall and is a 

mechanical fall


2.  Low back pain  even prior to the fall.


3.  Acute  L3 vertebral fracture,


4.  Lumbar spinal stenosis.


5.  Lumbar muscle spasms.


6.  Diabetes mellitus type 2.


7.  Hypertension.


8.  Hyperlipidemia.


9.  Chronic diastolic congestive heart failure.


10.  Persistent atrial fibrillation, on Coumadin.


      disuse myopathy


      E coli uti PTA


(1) Back pain


Status: Acute  








Plan


Plan of Care


kyphoplasty today


dismiss later today if okay with IR


start coumadin 1.5 mg daily when okay with IR


INR 4/12 in my office


dismiss with home health and home PT and OT


analgesics prn


d/c antibiotics when dismissed





Comment


Review of Relevant


I have reviewed the following items arianna (where applicable) has been applied.


Labs





Laboratory Tests








Test


 4/3/21


11:01 4/3/21


16:10 4/3/21


20:40 4/4/21


06:15


 


Glucose (Fingerstick)


 194 mg/dL


(70-99) 126 mg/dL


(70-99) 192 mg/dL


(70-99) 





 


White Blood Count


 


 


 


 4.4 x10^3/uL


(4.0-11.0)


 


Red Blood Count


 


 


 


 3.86 x10^6/uL


(3.50-5.40)


 


Hemoglobin


 


 


 


 12.5 g/dL


(12.0-15.5)


 


Hematocrit


 


 


 


 37.1 %


(36.0-47.0)


 


Mean Corpuscular Volume    96 fL () 


 


Mean Corpuscular Hemoglobin    33 pg (25-35) 


 


Mean Corpuscular Hemoglobin


Concent 


 


 


 34 g/dL


(31-37)


 


Red Cell Distribution Width


 


 


 


 14.9 %


(11.5-14.5)


 


Platelet Count


 


 


 


 228 x10^3/uL


(140-400)


 


Neutrophils (%) (Auto)    48 % (31-73) 


 


Lymphocytes (%) (Auto)    33 % (24-48) 


 


Monocytes (%) (Auto)    18 % (0-9) 


 


Eosinophils (%) (Auto)    1 % (0-3) 


 


Basophils (%) (Auto)    0 % (0-3) 


 


Neutrophils # (Auto)


 


 


 


 2.1 x10^3/uL


(1.8-7.7)


 


Lymphocytes # (Auto)


 


 


 


 1.5 x10^3/uL


(1.0-4.8)


 


Monocytes # (Auto)


 


 


 


 0.8 x10^3/uL


(0.0-1.1)


 


Eosinophils # (Auto)


 


 


 


 0.0 x10^3/uL


(0.0-0.7)


 


Basophils # (Auto)


 


 


 


 0.0 x10^3/uL


(0.0-0.2)


 


Prothrombin Time


 


 


 


 19.3 SEC


(11.7-14.0)


 


Prothromb Time International


Ratio 


 


 


 1.7 (0.8-1.1) 





 


Sodium Level


 


 


 


 141 mmol/L


(136-145)


 


Potassium Level


 


 


 


 4.6 mmol/L


(3.5-5.1)


 


Chloride Level


 


 


 


 105 mmol/L


()


 


Carbon Dioxide Level


 


 


 


 27 mmol/L


(21-32)


 


Anion Gap    9 (6-14) 


 


Blood Urea Nitrogen


 


 


 


 28 mg/dL


(7-20)


 


Creatinine


 


 


 


 1.1 mg/dL


(0.6-1.0)


 


Estimated GFR


(Cockcroft-Gault) 


 


 


 47.1 





 


Glucose Level


 


 


 


 89 mg/dL


(70-99)


 


Calcium Level


 


 


 


 9.0 mg/dL


(8.5-10.1)


 


Test


 4/4/21


07:02 4/4/21


11:18 4/4/21


16:20 4/4/21


20:19


 


Glucose (Fingerstick)


 95 mg/dL


(70-99) 164 mg/dL


(70-99) 86 mg/dL


(70-99) 135 mg/dL


(70-99)


 


Test


 4/5/21


03:50 4/5/21


07:24 


 





 


Prothrombin Time


 16.7 SEC


(11.7-14.0) 


 


 





 


Prothromb Time International


Ratio 1.4 (0.8-1.1) 


 


 


 





 


Glucose (Fingerstick)


 


 94 mg/dL


(70-99) 


 











Laboratory Tests








Test


 4/4/21


11:18 4/4/21


16:20 4/4/21


20:19 4/5/21


03:50


 


Glucose (Fingerstick)


 164 mg/dL


(70-99) 86 mg/dL


(70-99) 135 mg/dL


(70-99) 





 


Prothrombin Time


 


 


 


 16.7 SEC


(11.7-14.0)


 


Prothromb Time International


Ratio 


 


 


 1.4 (0.8-1.1) 





 


Test


 4/5/21


07:24 


 


 





 


Glucose (Fingerstick)


 94 mg/dL


(70-99) 


 


 











Medications





Current Medications


Ondansetron HCl (Zofran) 4 mg PRN Q8HRS  PRN IV NAUSEA/VOMITING;  Start 3/31/21 

at 16:30;  Stop 4/1/21 at 16:29;  Status DC


Morphine Sulfate (Morphine Sulfate) 2 mg PRN Q2HR  PRN IV PAIN;  Start 3/31/21 

at 16:30;  Stop 3/31/21 at 17:45;  Status DC


Morphine Sulfate (Morphine Sulfate) 1 mg PRN Q4HRS  PRN IV SEVERE PAIN 7-10;  

Start 3/31/21 at 17:15


Amlodipine Besylate (Norvasc) 10 mg DAILY PO  Last administered on 4/4/21at 

09:53;  Start 4/1/21 at 09:00


Carvedilol (Coreg) 6.25 mg BIDWMEALS PO  Last administered on 4/4/21at 16:24;  

Start 3/31/21 at 21:00


Cyclobenzaprine HCl (Flexeril) 5 mg PRN TID  PRN PO MUSCLE SPASMS Last 

administered on 3/31/21at 21:50;  Start 3/31/21 at 17:15


Furosemide (Lasix) 40 mg DAILY PO  Last administered on 4/4/21at 09:51;  Start 

4/1/21 at 09:00


Glipizide (Glucotrol) 2.5 mg DAILYWBKFT PO  Last administered on 4/4/21at 09:52;

 Start 4/1/21 at 08:00


Acetaminophen/ Hydrocodone Bitart (Lortab 5/325) 1 tab QHS PO  Last administered

on 4/4/21at 21:33;  Start 3/31/21 at 21:00


Acetaminophen/ Hydrocodone Bitart (Lortab 5/325) 1 tab PRN Q4HRS  PRN PO SEVERE 

PAIN 7-10 Last administered on 4/4/21at 13:35;  Start 3/31/21 at 17:15


Losartan Potassium (Cozaar) 50 mg DAILY PO  Last administered on 4/4/21at 09:53;

 Start 4/1/21 at 09:00


Atorvastatin Calcium (Lipitor) 10 mg QHS PO  Last administered on 4/4/21at 

21:32;  Start 3/31/21 at 21:00


Metformin HCl (Glucophage) 500 mg BIDWMEALS PO  Last administered on 4/4/21at 

16:24;  Start 4/1/21 at 08:00


Polyethylene Glycol (miraLAX PACKET) 17 gm DAILY PO  Last administered on 4/4/2

1at 09:52;  Start 4/1/21 at 09:00


Potassium Chloride (Klor-Con) 20 meq DAILY PO  Last administered on 4/1/21at 

08:54;  Start 4/1/21 at 09:00;  Stop 4/1/21 at 10:21;  Status DC


Warfarin Sodium (Coumadin) 1.5 mg DAILY16 PO  Last administered on 3/31/21at 19:

30;  Start 3/31/21 at 19:30;  Stop 4/2/21 at 04:49;  Status DC


Acetaminophen (Tylenol) 650 mg PRN Q6HRS  PRN PO MILD PAIN / TEMP > 100.3'F;  

Start 3/31/21 at 17:15


Magnesium Hydroxide (Milk Of Magnesia) 2,400 mg PRN DAILY  PRN PO CONSTIPATION; 

Start 3/31/21 at 17:15


Warfarin Sodium (Coumadin Per Physician) 1 each PRN DAILY  PRN MC SEE COMMENTS 

Last administered on 4/3/21at 11:24;  Start 3/31/21 at 18:30


Potassium Chloride (Klor-Con) 20 meq BID PO  Last administered on 4/4/21at 

09:52;  Start 4/1/21 at 21:00;  Stop 4/4/21 at 10:45;  Status DC


Ceftriaxone Sodium (Rocephin) 1 gm Q24H IVP  Last administered on 4/4/21at 

13:15;  Start 4/1/21 at 13:00


Warfarin Sodium (Coumadin) 1 mg DAILY16 PO ;  Start 4/2/21 at 16:00;  Stop 

4/2/21 at 10:15;  Status DC


Warfarin Sodium (Coumadin) 0.5 mg DAILY16 PO ;  Start 4/2/21 at 04:49;  Stop 

4/2/21 at 10:15;  Status DC


Vitamin D (Vitamin D3) 2,000 unit DAILY PO  Last administered on 4/4/21at 09:51;

 Start 4/3/21 at 09:00


Lactobacillus Rhamnosus (Culturelle) 1 cap BID PO  Last administered on 4/4/21at

21:32;  Start 4/2/21 at 21:00


Potassium Chloride (Klor-Con) 20 meq DAILY PO ;  Start 4/5/21 at 09:00





Active Scripts


Active


Hydrocodone-Apap 5-325  ** (Hydrocodone Bit/Acetaminophen) 1 Tab Tablet 1 Tab PO

QHS


Warfarin Sodium 1 Mg Tablet 1.5 Mg PO DAILY


Cyclobenzaprine Hcl 10 Mg Tablet 5 Mg PO PRN TID PRN


Vitamin D3 (Cholecalciferol (Vitamin D3)) 25 Mcg Tablet 2,000 Unit PO DAILY


Glipizide 5 Mg Tablet 2.5 Mg PO [id number]


Furosemide 40 Mg Tablet 40 Mg PO DAILY


Reported


Carvedilol 25 Mg Tablet 6.25 Mg PO BIDWMEALS


Aspirin Ec (Aspirin) 81 Mg Tablet.dr 81 Mg PO 


Potassium Chloride ** (Potassium Chloride) 20 Meq Tablet.er 20 Meq PO DAILY


Lovastatin 40 Mg Tablet 40 Mg PO HS


Metformin Hcl 500 Mg Tablet 500 Mg PO BIDWMEALS


Tylenol (Acetaminophen) 325 Mg Tablet 1-2 Tab PO PRN Q4HRS


Losartan Potassium 100 Mg Tablet 50 Mg PO DAILY


Amlodipine Besylate 10 Mg Tablet 10 Mg PO DAILY


Vitals/I & O





Vital Sign - Last 24 Hours








 4/4/21 4/4/21 4/4/21 4/4/21





 09:51 09:53 09:53 10:41


 


Temp    97.7





    97.7


 


Pulse 89 89 89 96


 


Resp    18


 


B/P (MAP) 163/70 163/70 163/70 135/60 (85)


 


Pulse Ox    97


 


O2 Delivery    Room Air


 


    





    





 4/4/21 4/4/21 4/4/21 4/4/21





 13:35 14:35 14:49 16:24


 


Temp   98.0 





   98.0 


 


Pulse   89 89


 


Resp   18 


 


B/P (MAP)   129/64 (85) 129/64


 


Pulse Ox   94 


 


O2 Delivery Room Air Room Air Room Air 


 


    





    





 4/4/21 4/4/21 4/4/21 4/4/21





 19:00 19:30 21:33 22:30


 


Temp 98.3   





 98.3   


 


Pulse 76   


 


Resp 18  18 18


 


B/P (MAP) 122/46 (71)   


 


Pulse Ox 100   


 


O2 Delivery Room Air Room Air Room Air Room Air


 


    





    





 4/4/21 4/5/21  





 23:05 03:17  


 


Temp 97.4 97.6  





 97.4 97.6  


 


Pulse 95 80  


 


Resp 18 18  


 


B/P (MAP) 140/56 (84) 127/44 (71)  


 


Pulse Ox 97 95  


 


O2 Delivery Room Air Room Air  














Intake and Output   


 


 4/4/21 4/4/21 4/5/21





 14:59 22:59 06:59


 


Output Total   200 ml


 


Balance   -200 ml











Justifications for Admission


Other Justification














ADRIANA TENORIO MD            Apr 5, 2021 08:09

## 2021-04-06 NOTE — RAD
Fluoroscopically guided kyphoplasty L3



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

demineralization with severe back pain, refractory to conservative treatment

measures, and limited activities of daily living. 

 

Fluoro time:7 Minutes

Dose area product: 38 Gycm2



Moderate sedation: The patient was appropriately monitored by a qualified

independent observer throughout the course of the moderate sedation. 

Face-to-face sedation time:37 minutes

 

Consent: The risks and benefits of the procedure were discussed with the

patient.  Informed consent was obtained.  The patient was brought to the

fluoroscopy suite and placed in the prone position.  A timeout procedure was

performed.  Preprocedural antibiotics were administered.



Procedure: The overlying skin was prepped and draped in the usual sterile

fashion.  All elements of maximal sterile barrier technique including the use

of a cap, mask, sterile gown, sterile gloves, large sterile sheet, appropriate

hand hygiene, and 2% chlorhexidine for cutaneous antisepsis (or acceptable

alternative antiseptic per current guidelines) were followed for this

procedure.

 



Using a left transpedicular approach, and direct fluoroscopic guidance, a

trocar needle was advanced to the posterior third of the targeted L3 vertebral

body.  Vertebral augmentation balloon was then coaxially introduced through

the needle, into the more central vertebral body and was deployed.  A curved

cement delivery needle was advanced into the contralateral vertebral body. 

Contrast opacified polymethylmethacrylate was then very slowly and carefully

introduced through the vertebral augmentation needle, using strict

fluoroscopic control.  Once adequate filling had been achieved the needles

were removed and manual pressure was held.  No significant extravasation or

complication was identified.  Sterile dressing was applied.  Patient tolerated

the procedure well, without apparent complication.



Impression: Fluoroscopically guided kyphoplasty, L3

## 2021-07-13 ENCOUNTER — HOSPITAL ENCOUNTER (OUTPATIENT)
Dept: HOSPITAL 63 - SPEC | Age: 86
End: 2021-07-13
Payer: MEDICARE

## 2021-07-13 DIAGNOSIS — Z79.01: ICD-10-CM

## 2021-07-13 DIAGNOSIS — Z51.81: Primary | ICD-10-CM

## 2021-07-13 LAB
ANION GAP SERPL CALC-SCNC: 11 MMOL/L (ref 6–14)
CA-I SERPL ISE-MCNC: 16 MG/DL (ref 7–20)
CALCIUM SERPL-MCNC: 8.9 MG/DL (ref 8.5–10.1)
CHLORIDE SERPL-SCNC: 104 MMOL/L (ref 98–107)
CO2 SERPL-SCNC: 29 MMOL/L (ref 21–32)
CREAT SERPL-MCNC: 0.9 MG/DL (ref 0.6–1)
GFR SERPLBLD BASED ON 1.73 SQ M-ARVRAT: 59.2 ML/MIN
GLUCOSE SERPL-MCNC: 72 MG/DL (ref 70–99)
POTASSIUM SERPL-SCNC: 3.8 MMOL/L (ref 3.5–5.1)
SODIUM SERPL-SCNC: 144 MMOL/L (ref 136–145)

## 2021-07-13 PROCEDURE — 80048 BASIC METABOLIC PNL TOTAL CA: CPT

## 2021-07-13 PROCEDURE — 36415 COLL VENOUS BLD VENIPUNCTURE: CPT

## 2021-07-13 PROCEDURE — 85610 PROTHROMBIN TIME: CPT
